# Patient Record
Sex: FEMALE | Race: WHITE | NOT HISPANIC OR LATINO | Employment: UNEMPLOYED | URBAN - METROPOLITAN AREA
[De-identification: names, ages, dates, MRNs, and addresses within clinical notes are randomized per-mention and may not be internally consistent; named-entity substitution may affect disease eponyms.]

---

## 2021-05-26 ENCOUNTER — RECORDS - HEALTHEAST (OUTPATIENT)
Dept: ADMINISTRATIVE | Facility: CLINIC | Age: 66
End: 2021-05-26

## 2022-01-01 ENCOUNTER — APPOINTMENT (OUTPATIENT)
Dept: CARDIOLOGY | Facility: CLINIC | Age: 67
DRG: 175 | End: 2022-01-01
Attending: STUDENT IN AN ORGANIZED HEALTH CARE EDUCATION/TRAINING PROGRAM
Payer: COMMERCIAL

## 2022-01-01 ENCOUNTER — APPOINTMENT (OUTPATIENT)
Dept: CT IMAGING | Facility: CLINIC | Age: 67
DRG: 175 | End: 2022-01-01
Attending: EMERGENCY MEDICINE
Payer: COMMERCIAL

## 2022-01-01 ENCOUNTER — PATIENT OUTREACH (OUTPATIENT)
Dept: CARE COORDINATION | Facility: CLINIC | Age: 67
End: 2022-01-01

## 2022-01-01 ENCOUNTER — APPOINTMENT (OUTPATIENT)
Dept: ULTRASOUND IMAGING | Facility: CLINIC | Age: 67
DRG: 175 | End: 2022-01-01
Attending: EMERGENCY MEDICINE
Payer: COMMERCIAL

## 2022-01-01 ENCOUNTER — HOSPITAL ENCOUNTER (INPATIENT)
Facility: CLINIC | Age: 67
LOS: 3 days | Discharge: HOME | DRG: 175 | End: 2022-12-27
Attending: EMERGENCY MEDICINE | Admitting: STUDENT IN AN ORGANIZED HEALTH CARE EDUCATION/TRAINING PROGRAM
Payer: COMMERCIAL

## 2022-01-01 VITALS
OXYGEN SATURATION: 92 % | DIASTOLIC BLOOD PRESSURE: 69 MMHG | TEMPERATURE: 97.6 F | HEART RATE: 82 BPM | HEIGHT: 65 IN | RESPIRATION RATE: 16 BRPM | WEIGHT: 139.11 LBS | BODY MASS INDEX: 23.18 KG/M2 | SYSTOLIC BLOOD PRESSURE: 100 MMHG

## 2022-01-01 DIAGNOSIS — M79.89 LEFT LEG SWELLING: ICD-10-CM

## 2022-01-01 DIAGNOSIS — J18.9 PNEUMONIA OF LEFT LOWER LOBE DUE TO INFECTIOUS ORGANISM: ICD-10-CM

## 2022-01-01 DIAGNOSIS — R06.02 SOB (SHORTNESS OF BREATH): ICD-10-CM

## 2022-01-01 DIAGNOSIS — Z00.00 PREVENTATIVE HEALTH CARE: Primary | ICD-10-CM

## 2022-01-01 DIAGNOSIS — C34.92 STAGE IV ADENOCARCINOMA OF LUNG, LEFT (H): ICD-10-CM

## 2022-01-01 DIAGNOSIS — I26.94 MULTIPLE SUBSEGMENTAL PULMONARY EMBOLI WITHOUT ACUTE COR PULMONALE (H): ICD-10-CM

## 2022-01-01 LAB
ALBUMIN SERPL-MCNC: 2.5 G/DL (ref 3.5–5)
ALP SERPL-CCNC: 60 U/L (ref 45–120)
ALT SERPL W P-5'-P-CCNC: 65 U/L (ref 0–45)
ANION GAP SERPL CALCULATED.3IONS-SCNC: 11 MMOL/L (ref 5–18)
ANION GAP SERPL CALCULATED.3IONS-SCNC: 8 MMOL/L (ref 5–18)
APTT PPP: 26 SECONDS (ref 22–38)
AST SERPL W P-5'-P-CCNC: 65 U/L (ref 0–40)
BASOPHILS # BLD AUTO: 0 10E3/UL (ref 0–0.2)
BASOPHILS NFR BLD AUTO: 0 %
BILIRUB SERPL-MCNC: <0.1 MG/DL (ref 0–1)
BNP SERPL-MCNC: 38 PG/ML (ref 0–112)
BUN SERPL-MCNC: 17 MG/DL (ref 8–22)
BUN SERPL-MCNC: 21 MG/DL (ref 8–22)
CALCIUM SERPL-MCNC: 8.5 MG/DL (ref 8.5–10.5)
CALCIUM SERPL-MCNC: 8.8 MG/DL (ref 8.5–10.5)
CHLORIDE BLD-SCNC: 105 MMOL/L (ref 98–107)
CHLORIDE BLD-SCNC: 110 MMOL/L (ref 98–107)
CO2 SERPL-SCNC: 21 MMOL/L (ref 22–31)
CO2 SERPL-SCNC: 22 MMOL/L (ref 22–31)
CREAT SERPL-MCNC: 0.82 MG/DL (ref 0.6–1.1)
CREAT SERPL-MCNC: 0.84 MG/DL (ref 0.6–1.1)
CREAT SERPL-MCNC: 0.85 MG/DL (ref 0.6–1.1)
CREAT SERPL-MCNC: 0.94 MG/DL (ref 0.6–1.1)
EOSINOPHIL # BLD AUTO: 0.1 10E3/UL (ref 0–0.7)
EOSINOPHIL NFR BLD AUTO: 1 %
ERYTHROCYTE [DISTWIDTH] IN BLOOD BY AUTOMATED COUNT: 16.2 % (ref 10–15)
ERYTHROCYTE [DISTWIDTH] IN BLOOD BY AUTOMATED COUNT: 16.3 % (ref 10–15)
FLUAV RNA SPEC QL NAA+PROBE: NEGATIVE
FLUBV RNA RESP QL NAA+PROBE: NEGATIVE
GFR SERPL CREATININE-BSD FRML MDRD: 66 ML/MIN/1.73M2
GFR SERPL CREATININE-BSD FRML MDRD: 75 ML/MIN/1.73M2
GFR SERPL CREATININE-BSD FRML MDRD: 76 ML/MIN/1.73M2
GFR SERPL CREATININE-BSD FRML MDRD: 78 ML/MIN/1.73M2
GLUCOSE BLD-MCNC: 111 MG/DL (ref 70–125)
GLUCOSE BLD-MCNC: 139 MG/DL (ref 70–125)
HCT VFR BLD AUTO: 28.4 % (ref 35–47)
HCT VFR BLD AUTO: 30.1 % (ref 35–47)
HGB BLD-MCNC: 10 G/DL (ref 11.7–15.7)
HGB BLD-MCNC: 9.3 G/DL (ref 11.7–15.7)
HOLD SPECIMEN: NORMAL
HOLD SPECIMEN: NORMAL
IMM GRANULOCYTES # BLD: 0.1 10E3/UL
IMM GRANULOCYTES NFR BLD: 2 %
INR PPP: 1.03 (ref 0.85–1.15)
LACTATE SERPL-SCNC: 0.7 MMOL/L (ref 0.7–2)
LVEF ECHO: NORMAL
LYMPHOCYTES # BLD AUTO: 0.9 10E3/UL (ref 0.8–5.3)
LYMPHOCYTES NFR BLD AUTO: 17 %
MCH RBC QN AUTO: 30.9 PG (ref 26.5–33)
MCH RBC QN AUTO: 31.5 PG (ref 26.5–33)
MCHC RBC AUTO-ENTMCNC: 32.7 G/DL (ref 31.5–36.5)
MCHC RBC AUTO-ENTMCNC: 33.2 G/DL (ref 31.5–36.5)
MCV RBC AUTO: 94 FL (ref 78–100)
MCV RBC AUTO: 95 FL (ref 78–100)
MONOCYTES # BLD AUTO: 0.5 10E3/UL (ref 0–1.3)
MONOCYTES NFR BLD AUTO: 9 %
MRSA DNA SPEC QL NAA+PROBE: NEGATIVE
NEUTROPHILS # BLD AUTO: 3.8 10E3/UL (ref 1.6–8.3)
NEUTROPHILS NFR BLD AUTO: 71 %
NRBC # BLD AUTO: 0 10E3/UL
NRBC BLD AUTO-RTO: 0 /100
PLATELET # BLD AUTO: 151 10E3/UL (ref 150–450)
PLATELET # BLD AUTO: 160 10E3/UL (ref 150–450)
POTASSIUM BLD-SCNC: 3.6 MMOL/L (ref 3.5–5)
POTASSIUM BLD-SCNC: 3.8 MMOL/L (ref 3.5–5)
PROCALCITONIN SERPL-MCNC: 0.18 NG/ML (ref 0–0.49)
PROT SERPL-MCNC: 5.6 G/DL (ref 6–8)
RBC # BLD AUTO: 3.01 10E6/UL (ref 3.8–5.2)
RBC # BLD AUTO: 3.17 10E6/UL (ref 3.8–5.2)
RSV RNA SPEC NAA+PROBE: NEGATIVE
SA TARGET DNA: NEGATIVE
SARS-COV-2 RNA RESP QL NAA+PROBE: NEGATIVE
SODIUM SERPL-SCNC: 137 MMOL/L (ref 136–145)
SODIUM SERPL-SCNC: 140 MMOL/L (ref 136–145)
TROPONIN I SERPL-MCNC: <0.01 NG/ML (ref 0–0.29)
UFH PPP CHRO-ACNC: 0.39 IU/ML
UFH PPP CHRO-ACNC: 0.43 IU/ML
UFH PPP CHRO-ACNC: 0.63 IU/ML
VANCOMYCIN SERPL-MCNC: 9.4 MG/L
WBC # BLD AUTO: 5.3 10E3/UL (ref 4–11)
WBC # BLD AUTO: 7.6 10E3/UL (ref 4–11)

## 2022-01-01 PROCEDURE — 99232 SBSQ HOSP IP/OBS MODERATE 35: CPT | Performed by: HOSPITALIST

## 2022-01-01 PROCEDURE — 87641 MR-STAPH DNA AMP PROBE: CPT | Performed by: STUDENT IN AN ORGANIZED HEALTH CARE EDUCATION/TRAINING PROGRAM

## 2022-01-01 PROCEDURE — 85027 COMPLETE CBC AUTOMATED: CPT | Performed by: EMERGENCY MEDICINE

## 2022-01-01 PROCEDURE — 85520 HEPARIN ASSAY: CPT | Performed by: HOSPITALIST

## 2022-01-01 PROCEDURE — 80048 BASIC METABOLIC PNL TOTAL CA: CPT | Performed by: EMERGENCY MEDICINE

## 2022-01-01 PROCEDURE — 84132 ASSAY OF SERUM POTASSIUM: CPT | Performed by: STUDENT IN AN ORGANIZED HEALTH CARE EDUCATION/TRAINING PROGRAM

## 2022-01-01 PROCEDURE — 250N000011 HC RX IP 250 OP 636: Performed by: STUDENT IN AN ORGANIZED HEALTH CARE EDUCATION/TRAINING PROGRAM

## 2022-01-01 PROCEDURE — 83605 ASSAY OF LACTIC ACID: CPT | Performed by: STUDENT IN AN ORGANIZED HEALTH CARE EDUCATION/TRAINING PROGRAM

## 2022-01-01 PROCEDURE — 99285 EMERGENCY DEPT VISIT HI MDM: CPT | Mod: CS,25

## 2022-01-01 PROCEDURE — 250N000013 HC RX MED GY IP 250 OP 250 PS 637: Performed by: HOSPITALIST

## 2022-01-01 PROCEDURE — 85730 THROMBOPLASTIN TIME PARTIAL: CPT | Performed by: EMERGENCY MEDICINE

## 2022-01-01 PROCEDURE — 99222 1ST HOSP IP/OBS MODERATE 55: CPT | Performed by: STUDENT IN AN ORGANIZED HEALTH CARE EDUCATION/TRAINING PROGRAM

## 2022-01-01 PROCEDURE — 85520 HEPARIN ASSAY: CPT | Performed by: STUDENT IN AN ORGANIZED HEALTH CARE EDUCATION/TRAINING PROGRAM

## 2022-01-01 PROCEDURE — 82565 ASSAY OF CREATININE: CPT | Performed by: STUDENT IN AN ORGANIZED HEALTH CARE EDUCATION/TRAINING PROGRAM

## 2022-01-01 PROCEDURE — 250N000011 HC RX IP 250 OP 636: Performed by: EMERGENCY MEDICINE

## 2022-01-01 PROCEDURE — 93306 TTE W/DOPPLER COMPLETE: CPT

## 2022-01-01 PROCEDURE — 120N000004 HC R&B MS OVERFLOW

## 2022-01-01 PROCEDURE — 71275 CT ANGIOGRAPHY CHEST: CPT

## 2022-01-01 PROCEDURE — 80202 ASSAY OF VANCOMYCIN: CPT | Performed by: HOSPITALIST

## 2022-01-01 PROCEDURE — 93306 TTE W/DOPPLER COMPLETE: CPT | Mod: 26 | Performed by: INTERNAL MEDICINE

## 2022-01-01 PROCEDURE — 258N000003 HC RX IP 258 OP 636: Performed by: STUDENT IN AN ORGANIZED HEALTH CARE EDUCATION/TRAINING PROGRAM

## 2022-01-01 PROCEDURE — 250N000013 HC RX MED GY IP 250 OP 250 PS 637: Performed by: STUDENT IN AN ORGANIZED HEALTH CARE EDUCATION/TRAINING PROGRAM

## 2022-01-01 PROCEDURE — 99233 SBSQ HOSP IP/OBS HIGH 50: CPT | Performed by: HOSPITALIST

## 2022-01-01 PROCEDURE — 93971 EXTREMITY STUDY: CPT | Mod: LT

## 2022-01-01 PROCEDURE — 87637 SARSCOV2&INF A&B&RSV AMP PRB: CPT | Performed by: EMERGENCY MEDICINE

## 2022-01-01 PROCEDURE — 85004 AUTOMATED DIFF WBC COUNT: CPT | Performed by: STUDENT IN AN ORGANIZED HEALTH CARE EDUCATION/TRAINING PROGRAM

## 2022-01-01 PROCEDURE — 80053 COMPREHEN METABOLIC PANEL: CPT | Performed by: STUDENT IN AN ORGANIZED HEALTH CARE EDUCATION/TRAINING PROGRAM

## 2022-01-01 PROCEDURE — 210N000002 HC R&B HEART CARE

## 2022-01-01 PROCEDURE — 93005 ELECTROCARDIOGRAM TRACING: CPT | Performed by: EMERGENCY MEDICINE

## 2022-01-01 PROCEDURE — 36415 COLL VENOUS BLD VENIPUNCTURE: CPT | Performed by: EMERGENCY MEDICINE

## 2022-01-01 PROCEDURE — 83880 ASSAY OF NATRIURETIC PEPTIDE: CPT | Performed by: EMERGENCY MEDICINE

## 2022-01-01 PROCEDURE — 250N000013 HC RX MED GY IP 250 OP 250 PS 637: Performed by: EMERGENCY MEDICINE

## 2022-01-01 PROCEDURE — 84484 ASSAY OF TROPONIN QUANT: CPT | Performed by: EMERGENCY MEDICINE

## 2022-01-01 PROCEDURE — 85610 PROTHROMBIN TIME: CPT | Performed by: EMERGENCY MEDICINE

## 2022-01-01 PROCEDURE — 84145 PROCALCITONIN (PCT): CPT | Performed by: STUDENT IN AN ORGANIZED HEALTH CARE EDUCATION/TRAINING PROGRAM

## 2022-01-01 PROCEDURE — 99239 HOSP IP/OBS DSCHRG MGMT >30: CPT | Performed by: HOSPITALIST

## 2022-01-01 PROCEDURE — C9803 HOPD COVID-19 SPEC COLLECT: HCPCS

## 2022-01-01 RX ORDER — LIDOCAINE 40 MG/G
CREAM TOPICAL
Status: DISCONTINUED | OUTPATIENT
Start: 2022-01-01 | End: 2022-01-01 | Stop reason: HOSPADM

## 2022-01-01 RX ORDER — ASPIRIN 81 MG/1
1 TABLET, CHEWABLE ORAL DAILY
Status: ON HOLD | COMMUNITY
Start: 2022-01-01 | End: 2022-01-01

## 2022-01-01 RX ORDER — NALOXONE HYDROCHLORIDE 0.4 MG/ML
0.2 INJECTION, SOLUTION INTRAMUSCULAR; INTRAVENOUS; SUBCUTANEOUS
Status: DISCONTINUED | OUTPATIENT
Start: 2022-01-01 | End: 2022-01-01 | Stop reason: HOSPADM

## 2022-01-01 RX ORDER — OXYCODONE AND ACETAMINOPHEN 5; 325 MG/1; MG/1
2 TABLET ORAL ONCE
Status: COMPLETED | OUTPATIENT
Start: 2022-01-01 | End: 2022-01-01

## 2022-01-01 RX ORDER — PROCHLORPERAZINE MALEATE 10 MG
10 TABLET ORAL EVERY 8 HOURS PRN
COMMUNITY
Start: 2022-01-01

## 2022-01-01 RX ORDER — OXYCODONE HYDROCHLORIDE 5 MG/1
5-10 TABLET ORAL EVERY 4 HOURS PRN
Status: DISCONTINUED | OUTPATIENT
Start: 2022-01-01 | End: 2022-01-01

## 2022-01-01 RX ORDER — QUETIAPINE FUMARATE 25 MG/1
50 TABLET, FILM COATED ORAL AT BEDTIME
Status: DISCONTINUED | OUTPATIENT
Start: 2022-01-01 | End: 2022-01-01 | Stop reason: HOSPADM

## 2022-01-01 RX ORDER — DEXAMETHASONE 4 MG/1
8 TABLET ORAL SEE ADMIN INSTRUCTIONS
COMMUNITY
Start: 2022-01-01

## 2022-01-01 RX ORDER — LIDOCAINE 4 G/G
1 PATCH TOPICAL
Status: DISCONTINUED | OUTPATIENT
Start: 2022-01-01 | End: 2022-01-01 | Stop reason: HOSPADM

## 2022-01-01 RX ORDER — LEVOFLOXACIN 750 MG/1
750 TABLET, FILM COATED ORAL DAILY
Status: DISCONTINUED | OUTPATIENT
Start: 2022-01-01 | End: 2022-01-01 | Stop reason: HOSPADM

## 2022-01-01 RX ORDER — POLYETHYLENE GLYCOL 3350 17 G/17G
17 POWDER, FOR SOLUTION ORAL DAILY
Status: DISCONTINUED | OUTPATIENT
Start: 2022-01-01 | End: 2022-01-01 | Stop reason: HOSPADM

## 2022-01-01 RX ORDER — LORAZEPAM 1 MG/1
1-2 TABLET ORAL EVERY 4 HOURS PRN
Status: DISCONTINUED | OUTPATIENT
Start: 2022-01-01 | End: 2022-01-01 | Stop reason: HOSPADM

## 2022-01-01 RX ORDER — ASPIRIN 81 MG/1
81 TABLET, CHEWABLE ORAL DAILY
Start: 2022-01-01

## 2022-01-01 RX ORDER — FOLIC ACID 1 MG/1
1000 TABLET ORAL DAILY
Status: DISCONTINUED | OUTPATIENT
Start: 2022-01-01 | End: 2022-01-01 | Stop reason: HOSPADM

## 2022-01-01 RX ORDER — OXYCODONE HYDROCHLORIDE 5 MG/1
5-10 TABLET ORAL EVERY 4 HOURS PRN
COMMUNITY
Start: 2022-01-01

## 2022-01-01 RX ORDER — MULTIPLE VITAMINS W/ MINERALS TAB 9MG-400MCG
2 TAB ORAL DAILY
Status: ON HOLD | COMMUNITY
End: 2022-01-01

## 2022-01-01 RX ORDER — MORPHINE SULFATE 15 MG/1
15 TABLET, FILM COATED, EXTENDED RELEASE ORAL EVERY 8 HOURS
Status: DISCONTINUED | OUTPATIENT
Start: 2022-01-01 | End: 2022-01-01 | Stop reason: HOSPADM

## 2022-01-01 RX ORDER — ONDANSETRON 8 MG/1
8 TABLET, FILM COATED ORAL EVERY 8 HOURS PRN
COMMUNITY
Start: 2022-01-01

## 2022-01-01 RX ORDER — NALOXONE HYDROCHLORIDE 0.4 MG/ML
0.4 INJECTION, SOLUTION INTRAMUSCULAR; INTRAVENOUS; SUBCUTANEOUS
Status: DISCONTINUED | OUTPATIENT
Start: 2022-01-01 | End: 2022-01-01 | Stop reason: HOSPADM

## 2022-01-01 RX ORDER — POLYETHYLENE GLYCOL 3350 17 G/17G
17 POWDER, FOR SOLUTION ORAL DAILY
COMMUNITY
Start: 2022-01-01

## 2022-01-01 RX ORDER — GABAPENTIN 300 MG/1
300 CAPSULE ORAL ONCE
Status: COMPLETED | OUTPATIENT
Start: 2022-01-01 | End: 2022-01-01

## 2022-01-01 RX ORDER — PIPERACILLIN SODIUM, TAZOBACTAM SODIUM 3; .375 G/15ML; G/15ML
3.38 INJECTION, POWDER, LYOPHILIZED, FOR SOLUTION INTRAVENOUS ONCE
Status: COMPLETED | OUTPATIENT
Start: 2022-01-01 | End: 2022-01-01

## 2022-01-01 RX ORDER — SODIUM CHLORIDE, SODIUM LACTATE, POTASSIUM CHLORIDE, CALCIUM CHLORIDE 600; 310; 30; 20 MG/100ML; MG/100ML; MG/100ML; MG/100ML
INJECTION, SOLUTION INTRAVENOUS ONCE
Status: COMPLETED | OUTPATIENT
Start: 2022-01-01 | End: 2022-01-01

## 2022-01-01 RX ORDER — GABAPENTIN 300 MG/1
300 CAPSULE ORAL 3 TIMES DAILY
Status: DISCONTINUED | OUTPATIENT
Start: 2022-01-01 | End: 2022-01-01 | Stop reason: HOSPADM

## 2022-01-01 RX ORDER — MORPHINE SULFATE 15 MG/1
15 TABLET, FILM COATED, EXTENDED RELEASE ORAL EVERY 8 HOURS
COMMUNITY
Start: 2022-01-01

## 2022-01-01 RX ORDER — PIPERACILLIN SODIUM, TAZOBACTAM SODIUM 3; .375 G/15ML; G/15ML
3.38 INJECTION, POWDER, LYOPHILIZED, FOR SOLUTION INTRAVENOUS EVERY 8 HOURS
Status: DISCONTINUED | OUTPATIENT
Start: 2022-01-01 | End: 2022-01-01

## 2022-01-01 RX ORDER — LORAZEPAM 0.5 MG/1
1-2 TABLET ORAL EVERY 4 HOURS PRN
COMMUNITY
Start: 2022-01-01

## 2022-01-01 RX ORDER — SENNOSIDES 8.6 MG
1 TABLET ORAL 2 TIMES DAILY
Status: DISCONTINUED | OUTPATIENT
Start: 2022-01-01 | End: 2022-01-01 | Stop reason: HOSPADM

## 2022-01-01 RX ORDER — IBUPROFEN 200 MG
200-400 TABLET ORAL EVERY 4 HOURS PRN
Status: ON HOLD | COMMUNITY
End: 2022-01-01

## 2022-01-01 RX ORDER — SENNOSIDES A AND B 8.6 MG/1
17.2 TABLET, FILM COATED ORAL 2 TIMES DAILY
COMMUNITY
Start: 2022-05-24

## 2022-01-01 RX ORDER — GABAPENTIN 300 MG/1
300 CAPSULE ORAL 3 TIMES DAILY
COMMUNITY
Start: 2022-05-09 | End: 2023-12-07

## 2022-01-01 RX ORDER — TIOTROPIUM BROMIDE INHALATION SPRAY 3.12 UG/1
2 SPRAY, METERED RESPIRATORY (INHALATION) DAILY
COMMUNITY
Start: 2022-01-01

## 2022-01-01 RX ORDER — HEPARIN SODIUM,PORCINE 10 UNIT/ML
3 VIAL (ML) INTRAVENOUS
Status: DISCONTINUED | OUTPATIENT
Start: 2022-01-01 | End: 2022-01-01 | Stop reason: HOSPADM

## 2022-01-01 RX ORDER — HEPARIN SODIUM 10000 [USP'U]/100ML
0-5000 INJECTION, SOLUTION INTRAVENOUS CONTINUOUS
Status: DISCONTINUED | OUTPATIENT
Start: 2022-01-01 | End: 2022-01-01

## 2022-01-01 RX ORDER — NICOTINE 21 MG/24HR
1 PATCH, TRANSDERMAL 24 HOURS TRANSDERMAL EVERY 24 HOURS
COMMUNITY
Start: 2022-05-18 | End: 2022-01-01

## 2022-01-01 RX ORDER — LEVOFLOXACIN 500 MG/1
500 TABLET, FILM COATED ORAL DAILY
Qty: 3 TABLET | Refills: 0 | Status: SHIPPED | OUTPATIENT
Start: 2022-01-01 | End: 2022-01-01

## 2022-01-01 RX ORDER — ONDANSETRON 4 MG/1
8 TABLET, FILM COATED ORAL EVERY 8 HOURS PRN
Status: DISCONTINUED | OUTPATIENT
Start: 2022-01-01 | End: 2022-01-01 | Stop reason: HOSPADM

## 2022-01-01 RX ORDER — QUETIAPINE FUMARATE 50 MG/1
1-2 TABLET, FILM COATED ORAL AT BEDTIME
COMMUNITY
Start: 2022-01-01

## 2022-01-01 RX ORDER — OXYCODONE AND ACETAMINOPHEN 5; 325 MG/1; MG/1
1-2 TABLET ORAL EVERY 4 HOURS PRN
COMMUNITY
Start: 2022-01-01

## 2022-01-01 RX ORDER — FOLIC ACID 1 MG/1
1000 TABLET ORAL DAILY
Status: ON HOLD | COMMUNITY
Start: 2022-01-01 | End: 2022-01-01

## 2022-01-01 RX ORDER — PROCHLORPERAZINE MALEATE 10 MG
10 TABLET ORAL EVERY 8 HOURS PRN
Status: DISCONTINUED | OUTPATIENT
Start: 2022-01-01 | End: 2022-01-01 | Stop reason: HOSPADM

## 2022-01-01 RX ORDER — IOPAMIDOL 755 MG/ML
55 INJECTION, SOLUTION INTRAVASCULAR ONCE
Status: COMPLETED | OUTPATIENT
Start: 2022-01-01 | End: 2022-01-01

## 2022-01-01 RX ORDER — FOLIC ACID 1 MG/1
1000 TABLET ORAL DAILY
Start: 2022-01-01

## 2022-01-01 RX ORDER — MULTIPLE VITAMINS W/ MINERALS TAB 9MG-400MCG
2 TAB ORAL DAILY
Start: 2022-01-01

## 2022-01-01 RX ORDER — OXYCODONE AND ACETAMINOPHEN 5; 325 MG/1; MG/1
1-2 TABLET ORAL EVERY 4 HOURS PRN
Status: DISCONTINUED | OUTPATIENT
Start: 2022-01-01 | End: 2022-01-01 | Stop reason: HOSPADM

## 2022-01-01 RX ORDER — LIDOCAINE 50 MG/G
OINTMENT TOPICAL
COMMUNITY
Start: 2022-01-01

## 2022-01-01 RX ADMIN — HEPARIN SODIUM 1100 UNITS/HR: 10000 INJECTION, SOLUTION INTRAVENOUS at 12:03

## 2022-01-01 RX ADMIN — Medication 1500 MG: at 22:58

## 2022-01-01 RX ADMIN — APIXABAN 10 MG: 5 TABLET, FILM COATED ORAL at 21:44

## 2022-01-01 RX ADMIN — LORAZEPAM 1 MG: 1 TABLET ORAL at 12:04

## 2022-01-01 RX ADMIN — PIPERACILLIN AND TAZOBACTAM 3.38 G: 3; .375 INJECTION, POWDER, FOR SOLUTION INTRAVENOUS at 17:19

## 2022-01-01 RX ADMIN — GABAPENTIN 300 MG: 300 CAPSULE ORAL at 14:18

## 2022-01-01 RX ADMIN — QUETIAPINE FUMARATE 50 MG: 25 TABLET ORAL at 21:45

## 2022-01-01 RX ADMIN — FOLIC ACID 1000 MCG: 1 TABLET ORAL at 10:06

## 2022-01-01 RX ADMIN — OXYCODONE HYDROCHLORIDE AND ACETAMINOPHEN 1 TABLET: 5; 325 TABLET ORAL at 21:19

## 2022-01-01 RX ADMIN — MORPHINE SULFATE 15 MG: 15 TABLET, EXTENDED RELEASE ORAL at 21:20

## 2022-01-01 RX ADMIN — GABAPENTIN 300 MG: 300 CAPSULE ORAL at 08:03

## 2022-01-01 RX ADMIN — POLYETHYLENE GLYCOL 3350 17 G: 17 POWDER, FOR SOLUTION ORAL at 10:08

## 2022-01-01 RX ADMIN — UMECLIDINIUM 1 PUFF: 62.5 AEROSOL, POWDER ORAL at 09:01

## 2022-01-01 RX ADMIN — MORPHINE SULFATE 15 MG: 15 TABLET, EXTENDED RELEASE ORAL at 05:30

## 2022-01-01 RX ADMIN — OXYCODONE HYDROCHLORIDE AND ACETAMINOPHEN 2 TABLET: 5; 325 TABLET ORAL at 09:00

## 2022-01-01 RX ADMIN — PIPERACILLIN AND TAZOBACTAM 3.38 G: 3; .375 INJECTION, POWDER, FOR SOLUTION INTRAVENOUS at 01:48

## 2022-01-01 RX ADMIN — APIXABAN 10 MG: 5 TABLET, FILM COATED ORAL at 10:06

## 2022-01-01 RX ADMIN — LIDOCAINE 1 PATCH: 246 PATCH TOPICAL at 13:39

## 2022-01-01 RX ADMIN — OXYCODONE HYDROCHLORIDE AND ACETAMINOPHEN 2 TABLET: 5; 325 TABLET ORAL at 03:10

## 2022-01-01 RX ADMIN — GABAPENTIN 300 MG: 300 CAPSULE ORAL at 17:44

## 2022-01-01 RX ADMIN — MORPHINE SULFATE 15 MG: 15 TABLET, EXTENDED RELEASE ORAL at 13:40

## 2022-01-01 RX ADMIN — LEVOFLOXACIN 750 MG: 750 TABLET, FILM COATED ORAL at 10:07

## 2022-01-01 RX ADMIN — SENNOSIDES 1 TABLET: 8.6 TABLET, FILM COATED ORAL at 08:03

## 2022-01-01 RX ADMIN — GABAPENTIN 300 MG: 300 CAPSULE ORAL at 13:40

## 2022-01-01 RX ADMIN — HEPARIN SODIUM 1100 UNITS/HR: 10000 INJECTION, SOLUTION INTRAVENOUS at 13:27

## 2022-01-01 RX ADMIN — APIXABAN 10 MG: 5 TABLET, FILM COATED ORAL at 13:39

## 2022-01-01 RX ADMIN — POLYETHYLENE GLYCOL 3350 17 G: 17 POWDER, FOR SOLUTION ORAL at 08:04

## 2022-01-01 RX ADMIN — VANCOMYCIN HYDROCHLORIDE 750 MG: 5 INJECTION, POWDER, LYOPHILIZED, FOR SOLUTION INTRAVENOUS at 09:52

## 2022-01-01 RX ADMIN — MORPHINE SULFATE 15 MG: 15 TABLET, EXTENDED RELEASE ORAL at 21:45

## 2022-01-01 RX ADMIN — GABAPENTIN 300 MG: 300 CAPSULE ORAL at 09:01

## 2022-01-01 RX ADMIN — MORPHINE SULFATE 15 MG: 15 TABLET, EXTENDED RELEASE ORAL at 14:19

## 2022-01-01 RX ADMIN — SENNOSIDES 1 TABLET: 8.6 TABLET, FILM COATED ORAL at 21:18

## 2022-01-01 RX ADMIN — IOPAMIDOL 55 ML: 755 INJECTION, SOLUTION INTRAVENOUS at 18:55

## 2022-01-01 RX ADMIN — PIPERACILLIN AND TAZOBACTAM 3.38 G: 3; .375 INJECTION, POWDER, FOR SOLUTION INTRAVENOUS at 09:01

## 2022-01-01 RX ADMIN — SENNOSIDES 1 TABLET: 8.6 TABLET, FILM COATED ORAL at 21:45

## 2022-01-01 RX ADMIN — GABAPENTIN 300 MG: 300 CAPSULE ORAL at 10:08

## 2022-01-01 RX ADMIN — PIPERACILLIN AND TAZOBACTAM 3.38 G: 3; .375 INJECTION, POWDER, FOR SOLUTION INTRAVENOUS at 19:54

## 2022-01-01 RX ADMIN — SENNOSIDES 1 TABLET: 8.6 TABLET, FILM COATED ORAL at 09:00

## 2022-01-01 RX ADMIN — OXYCODONE HYDROCHLORIDE AND ACETAMINOPHEN 2 TABLET: 5; 325 TABLET ORAL at 08:40

## 2022-01-01 RX ADMIN — MORPHINE SULFATE 15 MG: 15 TABLET, EXTENDED RELEASE ORAL at 13:30

## 2022-01-01 RX ADMIN — MORPHINE SULFATE 15 MG: 15 TABLET, EXTENDED RELEASE ORAL at 05:48

## 2022-01-01 RX ADMIN — SENNOSIDES 1 TABLET: 8.6 TABLET, FILM COATED ORAL at 10:07

## 2022-01-01 RX ADMIN — LIDOCAINE 1 PATCH: 246 PATCH TOPICAL at 13:47

## 2022-01-01 RX ADMIN — UMECLIDINIUM 1 PUFF: 62.5 AEROSOL, POWDER ORAL at 10:09

## 2022-01-01 RX ADMIN — FOLIC ACID 1000 MCG: 1 TABLET ORAL at 08:04

## 2022-01-01 RX ADMIN — GABAPENTIN 300 MG: 300 CAPSULE ORAL at 21:44

## 2022-01-01 RX ADMIN — UMECLIDINIUM 1 PUFF: 62.5 AEROSOL, POWDER ORAL at 09:53

## 2022-01-01 RX ADMIN — LEVOFLOXACIN 750 MG: 750 TABLET, FILM COATED ORAL at 18:01

## 2022-01-01 RX ADMIN — GABAPENTIN 300 MG: 300 CAPSULE ORAL at 21:19

## 2022-01-01 RX ADMIN — PIPERACILLIN AND TAZOBACTAM 3.38 G: 3; .375 INJECTION, POWDER, FOR SOLUTION INTRAVENOUS at 02:54

## 2022-01-01 RX ADMIN — GABAPENTIN 300 MG: 300 CAPSULE ORAL at 13:25

## 2022-01-01 RX ADMIN — HEPARIN SODIUM 1100 UNITS/HR: 10000 INJECTION, SOLUTION INTRAVENOUS at 20:40

## 2022-01-01 RX ADMIN — OXYCODONE HYDROCHLORIDE AND ACETAMINOPHEN 2 TABLET: 5; 325 TABLET ORAL at 17:44

## 2022-01-01 RX ADMIN — PIPERACILLIN AND TAZOBACTAM 3.38 G: 3; .375 INJECTION, POWDER, FOR SOLUTION INTRAVENOUS at 09:52

## 2022-01-01 RX ADMIN — LIDOCAINE 1 PATCH: 246 PATCH TOPICAL at 14:19

## 2022-01-01 RX ADMIN — QUETIAPINE FUMARATE 50 MG: 25 TABLET ORAL at 23:45

## 2022-01-01 RX ADMIN — SODIUM CHLORIDE, POTASSIUM CHLORIDE, SODIUM LACTATE AND CALCIUM CHLORIDE: 600; 310; 30; 20 INJECTION, SOLUTION INTRAVENOUS at 22:57

## 2022-01-01 RX ADMIN — MORPHINE SULFATE 15 MG: 15 TABLET, EXTENDED RELEASE ORAL at 22:44

## 2022-01-01 RX ADMIN — FOLIC ACID 1000 MCG: 1 TABLET ORAL at 09:01

## 2022-01-01 RX ADMIN — QUETIAPINE FUMARATE 50 MG: 25 TABLET ORAL at 21:18

## 2022-01-01 RX ADMIN — MORPHINE SULFATE 15 MG: 15 TABLET, EXTENDED RELEASE ORAL at 06:10

## 2022-01-01 ASSESSMENT — ACTIVITIES OF DAILY LIVING (ADL)
ADLS_ACUITY_SCORE: 30
ADLS_ACUITY_SCORE: 34
ADLS_ACUITY_SCORE: 32
ADLS_ACUITY_SCORE: 33
ADLS_ACUITY_SCORE: 30
ADLS_ACUITY_SCORE: 30
ADLS_ACUITY_SCORE: 34
ADLS_ACUITY_SCORE: 30
ADLS_ACUITY_SCORE: 30
ADLS_ACUITY_SCORE: 34
TOILETING_ISSUES: NO
ADLS_ACUITY_SCORE: 30
ADLS_ACUITY_SCORE: 30
ADLS_ACUITY_SCORE: 34
ADLS_ACUITY_SCORE: 34
ADLS_ACUITY_SCORE: 32
ADLS_ACUITY_SCORE: 33
ADLS_ACUITY_SCORE: 30
ADLS_ACUITY_SCORE: 34
ADLS_ACUITY_SCORE: 30
ADLS_ACUITY_SCORE: 30
ADLS_ACUITY_SCORE: 34
ADLS_ACUITY_SCORE: 30
ADLS_ACUITY_SCORE: 34
ADLS_ACUITY_SCORE: 30
ADLS_ACUITY_SCORE: 30
ADLS_ACUITY_SCORE: 34
ADLS_ACUITY_SCORE: 33
DEPENDENT_IADLS:: CLEANING;LAUNDRY;SHOPPING;TRANSPORTATION
ADLS_ACUITY_SCORE: 37
ADLS_ACUITY_SCORE: 33
ADLS_ACUITY_SCORE: 34
ADLS_ACUITY_SCORE: 30
ADLS_ACUITY_SCORE: 30
ADLS_ACUITY_SCORE: 34
ADLS_ACUITY_SCORE: 30

## 2022-12-24 PROBLEM — I26.94 MULTIPLE SUBSEGMENTAL PULMONARY EMBOLI WITHOUT ACUTE COR PULMONALE (H): Status: ACTIVE | Noted: 2022-01-01

## 2022-12-24 PROBLEM — R06.02 SOB (SHORTNESS OF BREATH): Status: ACTIVE | Noted: 2022-01-01

## 2022-12-24 PROBLEM — M79.89 LEFT LEG SWELLING: Status: ACTIVE | Noted: 2022-01-01

## 2022-12-24 PROBLEM — C79.51 SECONDARY MALIGNANT NEOPLASM OF BONE (H): Status: ACTIVE | Noted: 2022-04-13

## 2022-12-24 PROBLEM — C79.31 SECONDARY MALIGNANT NEOPLASM OF BRAIN (H): Status: ACTIVE | Noted: 2022-04-13

## 2022-12-24 PROBLEM — C34.32 MALIGNANT NEOPLASM OF LOWER LOBE OF LEFT LUNG (H): Status: ACTIVE | Noted: 2022-04-13

## 2022-12-24 PROBLEM — J43.9 PULMONARY EMPHYSEMA (H): Status: ACTIVE | Noted: 2022-03-15

## 2022-12-24 NOTE — ED PROVIDER NOTES
EMERGENCY DEPARTMENT ENCOUNTER      NAME: Lindsay Diop  AGE: 67 year old female  YOB: 1955  MRN: [id number]  EVALUATION DATE & TIME: 12/24/2022  4:53 PM    PCP: No primary care provider on file.    ED PROVIDER: Brielle Melton MD    Chief Complaint   Patient presents with     Leg Swelling         FINAL IMPRESSION:  1. Multiple subsegmental pulmonary emboli without acute cor pulmonale (H)    2. SOB (shortness of breath)    3. Left leg swelling    4. Pneumonia of left lower lobe due to infectious organism          ED COURSE & MEDICAL DECISION MAKING:    Pertinent Labs & Imaging studies reviewed. 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  Approximately 1 week of slowly progressive dyspnea.  Patient had imaging 2 days ago without signs of significant worsening pulmonary metastasis, pulmonary edema, pericardial effusion.  There was a new left pleural effusion, this was not quantified on the CT read.  She has some chronic anemia that was down slightly 9.9 from 11.8.  With her left lower extremity swelling concern for DVT/concurrent PE given dyspnea.  Clinically no signs of erythema or warmth to suggest a cellulitis.  Less likely Ramos's cyst as the swelling does go to the groin.    Patient placed on monitor, IV established and blood obtained.  Twelve-lead EKG was obtained showing sinus rhythm.  No ischemic changes.  Patient given her home gabapentin and Percocet which she is due for.  Rapid COVID/influenza/RSV swab negative.  CBC, BMP, BNP, troponin, coags notable for baseline anemia with hemoglobin 10.0.  Duplex ultrasound left lower extremity unremarkable, no DVT.  CT PE study shows evidence of bilateral pulmonary embolism and new left lower lobe infiltrate.  As this is the location of her mass will treat as  a postobstructive pneumonia with Zosyn.  Started on heparin, and will be admitted to hospital for further management.       ED Course as of 12/24/22 1933   Sat Dec 24, 2022   1721 I met with the patient to gather history and perform an initial exam. PPE: gloves, procedure mask   1726 SpO2: 90 %   1813 Hemoglobin(!): 10.0  baseline   1906 CT reviewed by myself with evidence of pulmonary embolism   1915 Spoke w body rad - + PRIMO, lingula, RLL.  LLL infiltrate.    1916 I was informed by Radiology that CT shows PE   1917 I updated patient on CT results   1931 I discussed patient with Dr. Betancourt, Hospitalist       Medical Decision Making    History:    Supplemental history from: Family Member/Significant Other    External Record(s) reviewed: Outpatient Record: [facility] visits    Work Up:    Chart documentation includes differential considered and any EKGs or imaging independently interpreted by provider.    In additional to work up documented, I considered the following work up: See chart documentation, if applicable.    External consultation:    Discussion of management with another provider: See chart documentation, if applicable    Complicating factors:    Care impacted by chronic illness: Cancer/Chemotherapy and Other: Hypercholesterolemia    Care affected by social determinants of health: N/A    Disposition considerations: Admit.        At the conclusion of the encounter I discussed the results of all of the tests and the disposition. The questions were answered. The patient or family acknowledged understanding and was agreeable with the care plan.    CONSULTS:  Hospitalist    CRITICAL CARE:  Critical Care  Performed by: Brielle Melton MD  Authorized by: Brielle Melton MD     Total critical care time: 42 minutes  Criticalcare time was exclusive of separately billable procedures and treating other patients.  Critical care was necessary to treat or prevent imminent or life-threatening deterioration of the  following conditions: Cardiopulmonary decompensation, death, disability  Critical care was time spent personally by me on the following activities: development of treatment plan with patient or surrogate, discussions with consultants, examination of patient, evaluation of patient's response totreatment, obtaining history from patient or surrogate, ordering and performing treatments and interventions, ordering and review of laboratory studies, ordering and review of radiographic studies and re-evaluation ofpatient's condition, this excludes any separately billable procedures.      MEDICATIONS GIVEN IN THE EMERGENCY:  Medications   heparin ANTICOAGULANT Loading dose for HIGH INTENSITY TREATMENT * Give BEFORE starting heparin infusion (has no administration in time range)   heparin 25,000 units in 0.45% NaCl 250 mL ANTICOAGULANT infusion (has no administration in time range)   piperacillin-tazobactam (ZOSYN) 3.375 g vial to attach to  mL bag (has no administration in time range)   oxyCODONE-acetaminophen (PERCOCET) 5-325 MG per tablet 2 tablet (2 tablets Oral Given 12/24/22 1744)   gabapentin (NEURONTIN) capsule 300 mg (300 mg Oral Given 12/24/22 1744)   iopamidol (ISOVUE-370) solution 55 mL (55 mLs Intravenous Given 12/24/22 1855)       NEW PRESCRIPTIONS STARTED AT TODAY'S ER VISIT  New Prescriptions    No medications on file          =================================================================    HPI    Patient information was obtained from: Patient, Daughter    Use of Intrepreter: N/A      Lindsaytimothy Diop is a 67 year old female with pertinent medical history of hypercholesterolemia, pulmonary emphysema, malignant neoplasm of lower lobe of left lung, brain metastases, bone metastases, liver metastases who presents to the ED by walk in for evaluation of leg swelling.     Chart review:  12/22/2022: Patient's most recent Oncology visit: Patient went in for port labs. WBC 5.7, Hgb 9/9, Pl 141, ANC 3.8. CT and  MRI completed. Patient reported difficulty lifting her head up straight, pain to neck and low back. Patient's MS contin prescription increased, percocet prescribed PRN.      12/22/2022 CT Chest abd pelvis:  IMPRESSION:  1. Mass in the left lower lobe is very difficult to discretely measure due to adjacent fluid and atelectasis. It is grossly unchanged. An adjacent nodule also in the left lower lobe is increased in size.  2. New left pleural effusion.  3. Increase in size of the AP window lymph node in the mediastinum.    12/22/2022 MRI cervical spine:   IMPRESSION:  1. Nonspecific soft tissue edema and enhancement throughout the posterior paraspinal musculature of the cervical and upper thoracic region. Some additional edema and enhancement extending to involve the interspinous spaces centered at C5-6. No discrete soft tissue mass to suggest a neoplastic process. No loculated fluid collection or bony edema to support the diagnosis of spinal infection. Muscular strain/injury in the setting of trauma could have this appearance. In the absence of a traumatic history nonspecific inflammatory or autoimmune myositis can also be considered. Clinical correlation is needed. Consider repeat MRI if the patient's symptoms are progressive.  2. Multilevel cervical spondylosis.  3. At C5-6, mild to moderate spinal canal stenosis with moderate to severe right and severe left neural foraminal stenosis. Correlate with C6 radicular symptoms.  4. At C4-5, severe left neural foraminal stenosis. Correlate with left C5 radicular symptoms.  5. At C6-7, moderate spinal canal stenosis with mild to moderate right and moderate left neural foraminal stenosis.  6. Nodular enhancing lesion within the inferior right cerebellum consistent with known brain metastasis and similar to findings on 12/1/2022 brain MRI.      Per patient,  Patient reports left lower extremity swelling that began this morning. Patient noticed her foot was swollen when she  tried on some slippers only to find she was unable to put the left one on. Patient reports the swelling goes up to her thigh as well. Patient denies any pain with her leg swelling. Patient reports her chronic shortness of breath is worse than normal, and has been worsening for the past 1 week. Patient denies history of heart failure, clotting, or bleeding disorders. She denies any other concerns at this time.    Per daughter,  Patient recently had an MRI and CT at [facility]. Patient has abdominal swelling due to steroids. Patient had been undergoing chemotherapy, but recently ended chemo treatment. A lesion on patient's lower spine was identified in September and has grown since then. Patient has undergone 5 rounds of radiation to her back for this, with her most recent radiation treatment on 12/20 (4 days ago).       REVIEW OF SYSTEMS  Constitutional:  Denies fever, chills, weight loss or weakness   Respiratory: No wheeze or cough. Positive for chronic shortness of breath, worse over past 1 week  Cardiovascular:  No CP, palpitations  GI:  Denies abdominal pain, nausea, vomiting, diarrhea. Positive for abdominal distension  : Denies dysuria, denies hematuria  Musculoskeletal:  Denies any new muscle/joint pain, or loss of function. Positive for left lower extremity swelling.  Skin:  Denies rash, pallor  Neurologic:  Denies headache, focal weakness or sensory changes  All other systems negative unless noted in HPI.      PAST MEDICAL HISTORY:  Past Medical History:   Diagnosis Date     Bone metastasis (H)      Brain metastasis (H)      COPD (chronic obstructive pulmonary disease) (H)      HLD (hyperlipidemia)      Hypertension      Liver metastases (H)      Lung cancer (H)        PAST SURGICAL HISTORY:  History reviewed. No pertinent surgical history.    CURRENT MEDICATIONS:    Prior to Admission Medications   Prescriptions Last Dose Informant Patient Reported? Taking?   LORazepam (ATIVAN) 0.5 MG tablet   Yes No   Sig:  Take 1-2 mg by mouth every 4 hours as needed   QUEtiapine (SEROQUEL) 50 MG tablet   Yes No   Sig: Take 1-2 tablets by mouth At Bedtime   SPIRIVA RESPIMAT 2.5 MCG/ACT inhaler   Yes No   Sig: Inhale 2 puffs into the lungs daily   aspirin (ASA) 81 MG chewable tablet   Yes Yes   Sig: Take 1 tablet by mouth daily   dexamethasone (DECADRON) 4 MG tablet   Yes Yes   Sig: Take 8 mg by mouth See Admin Instructions Take 2 Tablets (8 mg) by mouth two times a day with meals. for 3 days the day before, the day of, and the day after treatment   folic acid (FOLVITE) 1 MG tablet   Yes No   Sig: Take 1,000 mcg by mouth daily   gabapentin (NEURONTIN) 300 MG capsule   Yes Yes   Sig: Take 300 mg by mouth 3 times daily   ibuprofen (ADVIL/MOTRIN) 200 MG tablet   Yes No   Sig: Take 200-400 mg by mouth every 4 hours as needed   lidocaine (XYLOCAINE) 5 % external ointment   Yes Yes   Sig: 3 times daily   morphine (MS CONTIN) 15 MG CR tablet   Yes Yes   Sig: Take 15 mg by mouth every 8 hours   multivitamin w/minerals (MULTIVITAMINS W/MINERALS) tablet   Yes No   Sig: Take 2 tablets by mouth daily   nicotine (NICODERM CQ) 21 MG/24HR 24 hr patch   Yes No   Sig: Apply 1 patch topically every 24 hours   oxyCODONE (ROXICODONE) 5 MG tablet   Yes No   Sig: Take 5-10 mg by mouth every 4 hours as needed   polyethylene glycol (MIRALAX) 17 GM/Dose powder   Yes Yes   Sig: Take 17 g by mouth daily   prochlorperazine (COMPAZINE) 10 MG tablet   Yes No   Sig: Take 10 mg by mouth every 8 hours as needed   senna (SENOKOT) 8.6 MG tablet   Yes Yes   Sig: Take 17.2 mg by mouth 2 times daily Can increase up to 4 tabs twice daily if ongoing constipation.      Facility-Administered Medications: None       ALLERGIES:  No Known Allergies    FAMILY HISTORY:  History reviewed. No pertinent family history.    SOCIAL HISTORY:        VITALS:  Patient Vitals for the past 24 hrs:   BP Temp Temp src Pulse Resp SpO2 Weight   12/24/22 1845 124/67 -- -- 81 -- -- --   12/24/22  1830 122/65 -- -- 70 -- -- --   12/24/22 1715 124/64 97.6  F (36.4  C) Temporal 80 18 95 % --   12/24/22 1631 130/62 97.8  F (36.6  C) Oral 85 18 90 % 62.1 kg (137 lb)       PHYSICAL EXAM    General Appearance: Chronically ill-appearing adult female, petite, no acute distress  Head:  Normocephalic  Eyes:   conjunctiva/corneas clear  ENT:  membranes are moist without pallor  Neck:  Supple, pain with range of motion to the neck, chronic  Chest:  No tenderness or deformity, no crepitus  Cardio:  Regular rate and rhythm, no murmur/gallop/rub  Pulm:  No respiratory distress, clear to auscultation bilaterally  Back:  no CVA tenderness, normal ROM  Abdomen:  Soft, non-tender, slightly distended but with no fluid wave, no rebound or guarding.  Extremities: 2-3+ edema to the left lower extremity to the groin.  No associated erythema, warmth.  No appreciable swelling to the right lower extremity.  Good distal pulses.  No pain with range of motion.  Compartments are soft.  Skin:  Skin warm, dry, no rashes  Neuro:  Alert and oriented ×3, moving all extremities, no gross sensory defects     RADIOLOGY/LABS:  Reviewed all pertinent imaging. Please see official radiology report. All pertinent labs reviewed and interpreted.    Results for orders placed or performed during the hospital encounter of 12/24/22   US Lower Extremity Venous Duplex Left    Impression    IMPRESSION:  1.  No deep venous thrombosis in the left lower extremity.   CT Chest Pulmonary Embolism w Contrast    Impression    IMPRESSION:  1.  Pulmonary emboli to the pulmonary arteries to the left upper lobe and lingula as well as to the right lower lobe. No evidence of right heart strain.  2.  Left lower lobe infiltrate and left pleural effusion. Infiltrate appears to be increased when compared with the previous exam.  3.  Increased size of the left lower lobe mass.  4.  Bilateral pulmonary nodules.  5.  Moderate emphysema.  6.  Increased mediastinal  lymphadenopathy.    7.  These findings were discussed with Dr. Basilio at the time of this dictation.   CBC (+ platelets, no diff)   Result Value Ref Range    WBC Count 7.6 4.0 - 11.0 10e3/uL    RBC Count 3.17 (L) 3.80 - 5.20 10e6/uL    Hemoglobin 10.0 (L) 11.7 - 15.7 g/dL    Hematocrit 30.1 (L) 35.0 - 47.0 %    MCV 95 78 - 100 fL    MCH 31.5 26.5 - 33.0 pg    MCHC 33.2 31.5 - 36.5 g/dL    RDW 16.3 (H) 10.0 - 15.0 %    Platelet Count 160 150 - 450 10e3/uL   Basic metabolic panel   Result Value Ref Range    Sodium 137 136 - 145 mmol/L    Potassium 3.8 3.5 - 5.0 mmol/L    Chloride 105 98 - 107 mmol/L    Carbon Dioxide (CO2) 21 (L) 22 - 31 mmol/L    Anion Gap 11 5 - 18 mmol/L    Urea Nitrogen 21 8 - 22 mg/dL    Creatinine 0.82 0.60 - 1.10 mg/dL    Calcium 8.8 8.5 - 10.5 mg/dL    Glucose 139 (H) 70 - 125 mg/dL    GFR Estimate 78 >60 mL/min/1.73m2   Result Value Ref Range    INR 1.03 0.85 - 1.15   Result Value Ref Range    aPTT 26 22 - 38 Seconds   B-Type Natriuretic Peptide ([facility] Only)   Result Value Ref Range    BNP 38 0 - 112 pg/mL   Troponin I (now)   Result Value Ref Range    Troponin I <0.01 0.00 - 0.29 ng/mL   Symptomatic Influenza A/B & SARS-CoV2 (COVID-19) Virus PCR Multiplex Nose    Specimen: Nose; Swab   Result Value Ref Range    Influenza A PCR Negative Negative    Influenza B PCR Negative Negative    RSV PCR Negative Negative    SARS CoV2 PCR Negative Negative       EKG:  Performed at: 6:32 PM    Impression: Sinus rhythm. Left axis deviation. Abnormal ECG    Rate: 70  Rhythm: Sinus  Axis: -37  OH Interval: 152  QRS Interval: 86  QTc Interval: 440  ST Changes: None  Comparison: No previous ECGs available    I have independently reviewed and interpreted the EKG(s) documented above.      The creation of this record is based on the scribe s observations of the work being performed by Brielle Melton MD and the provider s statements to them. It was created on her behalf by Soha Acevedo, a trained medical  kathie. This document has been checked and approved by the attending provider.    Brielle Melton MD  Emergency Medicine  [facility] EMERGENCY ROOM  [address] [facility] [id number] [phone]  Dept: [phone]       Brielle Melton MD  12/24/22 1934

## 2022-12-24 NOTE — ED TRIAGE NOTES
"Pt presents to the ED with c/o left leg swelling. Pt endorses hx of lesion on spine. Pt talked to oncologist and was told to come into the ED. Pt reports swelling from toes to upper left leg. Denies \"calf pain\" at this time. No hx of DVT. Not on blood thinners. Pt reports hx of chronic pain that she takes many pain meds for.      Triage Assessment     Row Name 12/24/22 8032       Triage Assessment (Adult)    Airway WDL WDL       Respiratory WDL    Respiratory WDL WDL       Skin Circulation/Temperature WDL    Skin Circulation/Temperature WDL WDL       Cardiac WDL    Cardiac WDL WDL       Peripheral/Neurovascular WDL    Peripheral Neurovascular WDL WDL       Cognitive/Neuro/Behavioral WDL    Cognitive/Neuro/Behavioral WDL WDL              "

## 2022-12-25 PROBLEM — C34.92: Status: ACTIVE | Noted: 2022-01-01

## 2022-12-25 PROBLEM — J18.9 PNEUMONIA OF LEFT LOWER LOBE DUE TO INFECTIOUS ORGANISM: Status: ACTIVE | Noted: 2022-01-01

## 2022-12-25 NOTE — H&P
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      #History of metastatic non-small lung adenocarcinoma.  #Pulmonary embolism  #Pneumonia  -Diagnosed with non-small left lung adenocarcinoma in 03/2022  -Multiple mets including liver, brain and bone.  -Underwent palliative radiation for bone pain.  -Currently following up with oncology as an outpatient and receiving chemotherapy via trial.  -Presented with few days of cough, left lower extremity swelling and mild shortness of breath.  - CTA chest showed multiple pulmonary emboli in the left upper lobe and right lower lobe.  Left lower lobe infiltrate with small left pleural effusion. Increased size of left lower lobe mass with bilateral pulmonary nodules.  Moderate emphysema.  -Left lower extremity ultrasound did not show any DVT.  -Started on heparin in the ED.  -Started on Zosyn in the emergency department.    Plan:  [] Continue heparin drip.  [] Transthoracic echocardiogram.  [] Continue Zosyn.  [] Start vancomycin.  [] Sputum culture.  [] Continue to monitor respiratory status.  [] Follow-up with blood cultures.  [] I explained to the patient the risk of her current medical condition and possible progression of her infection or complication by pulmonary embolism.  I tried to explain that in the case of cardiac arrest, no meaningful recovery after CPR is highly unlikely.  Nevertheless, the patient insisted on keeping her CODE STATUS full code for now.  I encouraged her to discuss her CODE STATUS and goals of care with her family and physician tomorrow.  [] Continue home pain medications including MS  constant 15 mg every 8 hours and oxycodone 5 to 10 mg every 4 hours as needed.  [] Continue bowel regimen.  [] Continue home Ativan as needed.  [] Continue home gabapentin 300 mg 3 times daily            DVTP: On heparin drip  Code Status: No Order  Disposition: Inpatient   Expected LOS: 2 days   Goals for the hospitalization:   Disposition Plan      Expected Discharge Date: 12/26/2022      Destination: home with family;home with help/services          The patient's care was discussed with the Patient.  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    She was in her usual state of health until 3 to 4 days ago when she started to experience intermittent productive cough.  On 12/23/2022 she noticed significant swelling of left lower extremity without any pain.  She also reported mild shortness of breath on exertion.  She denies any chest pain, heart capitation, syncope.  She denies any hemoptysis.  She denies any fever or chills.  She denies any nausea or vomiting.    Vitals on presentation: Blood pressure around 130/60, heart rate around 80, SPO2 around 92% on room air.  Labs significant for hemoglobin around 10, platelet around 160.  CTA chest showed multiple pulmonary emboli in the left upper lobe and right lower lobe.  Left lower lobe infiltrate with small left pleural effusion. Increased size of left lower lobe mass with bilateral pulmonary nodules.  Moderate emphysema.         Past Medical History     Past Medical History:  No date: Bone metastasis (H)  No date: Brain metastasis (H)  No date: COPD (chronic obstructive pulmonary disease) (H)  No date: HLD (hyperlipidemia)  No date:  Hypertension  No date: Liver metastases (H)  No date: Lung cancer (H)     Surgical History   History reviewed. No pertinent surgical history.  Family History    Reviewed, and History reviewed. No pertinent family history.     Social History          Allergies   No Known Allergies  Prior to Admission Medications      Prior to Admission Medications   Prescriptions Last Dose Informant Patient Reported? Taking?   LORazepam (ATIVAN) 0.5 MG tablet More than a month  Yes Yes   Sig: Take 1-2 mg by mouth every 4 hours as needed   QUEtiapine (SEROQUEL) 50 MG tablet 12/23/2022  Yes Yes   Sig: Take 1-2 tablets by mouth At Bedtime   SPIRIVA RESPIMAT 2.5 MCG/ACT inhaler 12/24/2022  Yes Yes   Sig: Inhale 2 puffs into the lungs daily   aspirin (ASA) 81 MG chewable tablet 12/24/2022  Yes Yes   Sig: Take 1 tablet by mouth daily   dexamethasone (DECADRON) 4 MG tablet 12/23/2022  Yes Yes   Sig: Take 8 mg by mouth See Admin Instructions Take 2 Tablets (8 mg) by mouth two times a day with meals. for 3 days the day before, the day of, and the day after treatment   folic acid (FOLVITE) 1 MG tablet 12/24/2022  Yes Yes   Sig: Take 1,000 mcg by mouth daily   gabapentin (NEURONTIN) 300 MG capsule 12/24/2022  Yes Yes   Sig: Take 300 mg by mouth 3 times daily   ibuprofen (ADVIL/MOTRIN) 200 MG tablet Past Week  Yes Yes   Sig: Take 200-400 mg by mouth every 4 hours as needed   lidocaine (XYLOCAINE) 5 % external ointment 12/24/2022  Yes Yes   Sig: 3 times daily   morphine (MS CONTIN) 15 MG CR tablet 12/24/2022 at am  Yes Yes   Sig: Take 15 mg by mouth every 8 hours   multivitamin w/minerals (THERA-VIT-M) tablet 12/24/2022  Yes Yes   Sig: Take 2 tablets by mouth daily   ondansetron (ZOFRAN) 8 MG tablet 12/23/2022  Yes Yes   Sig: Take 8 mg by mouth every 8 hours as needed   oxyCODONE (ROXICODONE) 5 MG tablet 12/24/2022  Yes Yes   Sig: Take 5-10 mg by mouth every 4 hours as needed   oxyCODONE-acetaminophen (PERCOCET) 5-325 MG tablet   Yes Yes   Sig:  Take 1-2 tablets by mouth every 4 hours as needed   polyethylene glycol (MIRALAX) 17 GM/Dose powder 12/24/2022  Yes Yes   Sig: Take 17 g by mouth daily   prochlorperazine (COMPAZINE) 10 MG tablet Past Week  Yes Yes   Sig: Take 10 mg by mouth every 8 hours as needed   senna (SENOKOT) 8.6 MG tablet 12/24/2022  Yes Yes   Sig: Take 17.2 mg by mouth 2 times daily Can increase up to 4 tabs twice daily if ongoing constipation.      Facility-Administered Medications: None      Review of Systems     A 12 point comprehensive review of systems was negative except as noted above in HPI.    PHYSICAL EXAMINATION       Vitals      Temp:  [97.6  F (36.4  C)-97.8  F (36.6  C)] 97.6  F (36.4  C)  Pulse:  [70-85] 72  Resp:  [18-26] 26  BP: ()/(51-67) 96/55  SpO2:  [90 %-96 %] 96 %    Examination     Physical Exam  Constitutional:       General: She is not in acute distress.  Cardiovascular:      Rate and Rhythm: Normal rate.   Pulmonary:      Effort: No respiratory distress.      Comments: Decreased breath sounds on the left lower lung.  Mild left basilar crackles.  Abdominal:      General: Abdomen is flat. There is no distension.      Palpations: Abdomen is soft.      Tenderness: There is no abdominal tenderness.   Musculoskeletal:         General: Swelling present.      Left lower leg: Edema present.      Comments: Mild edema of left lower extremity below knee level and compression to right lower extremity.   Skin:     General: Skin is warm and dry.   Neurological:      General: No focal deficit present.      Mental Status: She is alert.   Psychiatric:         Mood and Affect: Mood normal.         Behavior: Behavior normal.         Pertinent Lab     Most Recent 3 CBC's:Recent Labs   Lab Test 12/24/22  1755   WBC 7.6   HGB 10.0*   MCV 95        Most Recent 3 BMP's:Recent Labs   Lab Test 12/24/22  1755      POTASSIUM 3.8   CHLORIDE 105   CO2 21*   BUN 21   CR 0.82   ANIONGAP 11   CARMEN 8.8   *     Most Recent 2  LFT's:No lab results found.  Most Recent 3 INR's:Recent Labs   Lab Test 12/24/22  1755   INR 1.03         Pertinent Radiology     Radiology Results:   Recent Results (from the past 24 hour(s))   US Lower Extremity Venous Duplex Left    Narrative    EXAM: US LOWER EXTREMITY VENOUS DUPLEX LEFT  LOCATION: [facility]  DATE/TIME: 12/24/2022 6:30 PM    INDICATION: Left leg swelling. History of lung cancer with metastases.  COMPARISON: None.  TECHNIQUE: Venous Duplex ultrasound of the left lower extremity with and without compression, augmentation and duplex. Color flow and spectral Doppler with waveform analysis performed.    FINDINGS: Exam includes the common femoral, femoral, popliteal, and contralateral common femoral veins as well as segmentally visualized deep calf veins and greater saphenous vein.     LEFT: No deep vein thrombosis. No superficial thrombophlebitis.      Impression    IMPRESSION:  1.  No deep venous thrombosis in the left lower extremity.   CT Chest Pulmonary Embolism w Contrast    Narrative    EXAM: CT CHEST PULMONARY EMBOLISM W CONTRAST  LOCATION: [facility]  DATE/TIME: 12/24/2022 7:02 PM    INDICATION: sob lung ca eval PE  COMPARISON: 09/20/2022 TECHNIQUE: CT chest pulmonary angiogram during arterial phase injection of IV contrast. Multiplanar reformats and MIP reconstructions were performed. Dose reduction techniques were used.   CONTRAST: 55ml Isovue 370    FINDINGS:  ANGIOGRAM CHEST: Pulmonary emboli identified in the pulmonary arteries to the left upper lobe and lingula. Pulmonary embolism is also identified in the pulmonary artery to the right lower lobe.  Thoracic aorta is negative for dissection. No CT evidence of right heart strain.    LUNGS AND PLEURA: Moderate emphysematous changes of the lungs.  2. Related nodular densities are identified in the right upper lobe. These may be related to apical scarring. However, other etiologies cannot  be excluded. This includes a 6 nodular density on image #44, series 7 and 7 mm nodular density on image #41.   Additionally, in the inferior aspect of the right upper lobe, an 8 mm nodular density is identified, best identified on image #126. No infiltrate is identified in the left lower lobe. Small left pleural effusion. Diffuse groundglass opacities are   identified throughout the remainder of the left lower lobe. In the anterior aspect of the left lower lobe superiorly, a 6 mm pulmonary nodules identified best identified on image #136. No pneumothorax. A left lower lobe mass is identified laterally. This   has increased in size. On the current exam this measures approximately 2.6 x 2.6 cm as measured on image #186. This previously measured 1.6 x 1.5 cm.     MEDIASTINUM/AXILLAE: Right-sided Port-A-Cath. AP window lymphadenopathy has increased. No axillary lymphadenopathy. The heart is borderline in size. No pericardial effusion. Patulous distal esophagus.    CORONARY ARTERY CALCIFICATION: Mild.    UPPER ABDOMEN: No acute findings.    MUSCULOSKELETAL: Normal.      Impression    IMPRESSION:  1.  Pulmonary emboli to the pulmonary arteries to the left upper lobe and lingula as well as to the right lower lobe. No evidence of right heart strain.  2.  Left lower lobe infiltrate and left pleural effusion. Infiltrate appears to be increased when compared with the previous exam.  3.  Increased size of the left lower lobe mass.  4.  Bilateral pulmonary nodules.  5.  Moderate emphysema.  6.  Increased mediastinal lymphadenopathy.    7.  These findings were discussed with Dr. Basilio at the time of this dictation.     EKG Results: personally reviewed.     Advance Care Planning        DRE GUERRERO MD  [facility]   Phone: #[phone]

## 2022-12-25 NOTE — CONSULTS
Care Management Initial Consult    General Information  Assessment completed with: Patient,    Type of CM/SW Visit: Initial Assessment    Primary Care Provider verified and updated as needed: Yes   Readmission within the last 30 days: no previous admission in last 30 days      Reason for Consult: discharge planning  Advance Care Planning: Advance Care Planning Reviewed: other (see comments) (Given [employer] information)     General Information Comments: Lives alone    Communication Assessment  Patient's communication style: spoken language (English or Bilingual)    Hearing Difficulty or Deaf: no   Wear Glasses or Blind: yes    Cognitive  Cognitive/Neuro/Behavioral: WDL                      Living Environment:   People in home: alone     Current living Arrangements: apartment      Able to return to prior arrangements: yes  Living Arrangement Comments: Lives alone    Family/Social Support:  Care provided by: self, child(cristiano)  Provides care for: no one, unable/limited ability to care for self  Marital Status: Single  Children          Description of Support System: Supportive, Involved    Support Assessment: Adequate family and caregiver support, Lacks adequate physical care    Current Resources:   Patient receiving home care services: No     Community Resources: None  Equipment currently used at home: none  Supplies currently used at home: Wound Care Supplies    Employment/Financial:  Employment Status: retired        Financial Concerns: No concerns identified           Lifestyle & Psychosocial Needs:  Social Determinants of Health     Tobacco Use: Not on file   Alcohol Use: Not on file   Financial Resource Strain: Not on file   Food Insecurity: Not on file   Transportation Needs: Not on file   Physical Activity: Not on file   Stress: Not on file   Social Connections: Not on file   Intimate Partner Violence: Not on file   Depression: Not on file   Housing Stability: Not on file       Functional Status:  Prior  "to admission patient needed assistance:   Dependent ADLs:: Ambulation-no assistive device, Bathing  Dependent IADLs:: Cleaning, Laundry, Shopping, Transportation  Assesssment of Functional Status: Not at  functional baseline    Mental Health Status:          Chemical Dependency Status:              Values/Beliefs:  Spiritual, Cultural Beliefs, Gnosticism Practices, Values that affect care:                 Additional Information:   Patient presents to  ED with c/o left leg swelling. Patient endorses history of lesion on spine. Patient talked to oncologist and was told to come into the ED. Patient reports swelling from toes to upper left leg. Denies \"calf pain\" at this time .    Alert, oriented patient states she lives alone in an apartment. Recently stopped chemotherapy. Uses no assistive devices; no home care services; doesn t drive, daughter helps with transportation. Reports she sets up her medications, self-administers independently, and showers on her own though sometimes has trouble doing so. Discussed possible need for home care services to assist but patient states  I don t want any strangers in my house . Plans to return home with daughter Neida transporting patient on discharge. Informed patient CM will follow progression of care. Other needs pending clinical progress.      CHERELLE DELEON, JOSE D/CM        "

## 2022-12-25 NOTE — PLAN OF CARE
"Patient admitted to  at 2150. Heparin drip infusing in port. New IV access obtained for IV ABX and LR. Lactic acid sent.  Patient reports she usually goes to sleep much earlier and wakes up for the day between 0200 and 0400. Reports no bowel movement for \"a few days\" and would like something PRN in AM.     Lilia Parnell RN on 12/24/2022 at 11:03 PM      Problem: Gas Exchange Impaired  Goal: Optimal Gas Exchange  Outcome: Not Progressing   Goal Outcome Evaluation:                        "

## 2022-12-25 NOTE — PROVIDER NOTIFICATION
8:25 AM Text Paged Dr. Dumont: Pt requesting her PRN oxycodone be changed to Percocet, this is what she takes at home JAMIL BURGOS RN Callback #[phone]

## 2022-12-25 NOTE — PLAN OF CARE
Pt complains of severe to moderate pain with movement during shift, controlled with PRN medications. Per pt her pain feels similar to her chronic cancer pain. Lidocaine patch placed on back/neck. Heparin gtt within goal x2, recheck in the AM per protocol. Pt voiding adequate amount of urine. Decreased appetite but tolerating PO intake. Intermittent use of 1L NC oxygen to maintain O2 >90%. Able to make needs known.

## 2022-12-25 NOTE — PHARMACY-VANCOMYCIN DOSING SERVICE
"Pharmacy Vancomycin Initial Note  Date of Service 2022  Patient's  1955  67 year old, female    Indication: Community Acquired Pneumonia    Current estimated CrCl = Estimated Creatinine Clearance: 65.1 mL/min (based on SCr of 0.82 mg/dL).    Creatinine for last 3 days  2022:  5:55 PM Creatinine 0.82 mg/dL    Recent Vancomycin Level(s) for last 3 days  No results found for requested labs within last 72 hours.      Vancomycin IV Administrations (past 72 hours)      No vancomycin orders with administrations in past 72 hours.                Nephrotoxins and other renal medications (From now, onward)    Start     Dose/Rate Route Frequency Ordered Stop    22 0200  piperacillin-tazobactam (ZOSYN) 3.375 g vial to attach to  mL bag        Note to Pharmacy: For [facility], [facility] and [facility]: For Zosyn-naive patients, use the \"Zosyn initial dose + extended infusion\" order panel.    3.375 g  over 240 Minutes Intravenous EVERY 8 HOURS 22 2156            Contrast Orders - past 72 hours (72h ago, onward)    Start     Dose/Rate Route Frequency Stop    22 1900  iopamidol (ISOVUE-370) solution 55 mL         55 mL Intravenous ONCE 22 1855          [employer] Prediction of Planned Initial Vancomycin Regimen  Loading dose: 1500 mg at 22:30 2022.  Regimen: 750 mg IV every 12 hours.  Start time: 22:02 on 2022  Exposure target: AUC24 (range)400-600 mg/L.hr   AUC24,ss: 533 mg/L.hr  Probability of AUC24 > 400: 79 %  Ctrough,ss: 17.5 mg/L  Probability of Ctrough,ss > 20: 38 %  Probability of nephrotoxicity (Lodise BRY ): 13 %      Plan:  1. Start vancomycin  1500 mg IV once then 750mg q12h.   2. Vancomycin monitoring method: AUC  3. Vancomycin therapeutic monitoring goal: 400-600 mg*h/L  4. Pharmacy will check vancomycin levels as appropriate in 1-3 Days.    5. Serum creatinine levels will be ordered daily for the first week of therapy and at least twice weekly for subsequent weeks.  "     Michael Perez, [facility]

## 2022-12-25 NOTE — PROGRESS NOTES
Call to pt-advised of mom's call to ofc and gala RAY comments/recommendations noted below. Patient voices understanding/agrees with plan/no further questions. 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    Principal Problem:    Multiple subsegmental pulmonary emboli without acute cor pulmonale (H) (12/24/2022)  Active Problems:    LLL CAP (12/25/2022)    Stage IV NSC adenoca of lung, with BBL mets, Primary tumor LLL (H) (12/25/2022)    Pulmonary emphysema (H) (3/15/2022)    SOB (shortness of breath) (12/24/2022)    Left leg swelling (12/24/2022), with negative work-up    #History of metastatic non-small lung adenocarcinoma.  #Pulmonary embolism  #Pneumonia  -Diagnosed with non-small left lung adenocarcinoma in 03/2022  -Multiple mets including liver, brain and bone.  -Underwent palliative radiation for bone pain.  -Currently following up with oncology as an outpatient and receiving chemotherapy via trial.  -Presented with few days of cough, left lower extremity swelling and mild shortness of breath.  - CTA chest showed multiple pulmonary emboli in the left upper lobe and right lower lobe.  Left lower lobe infiltrate with small left pleural effusion. Increased size of left lower lobe mass with bilateral pulmonary nodules.  Moderate emphysema.  -Left lower extremity ultrasound did not show any DVT.  -Started on heparin in the ED.  -Started on Zosyn in the ER, vancomycin was added later    Plan:  Continue heparin drip.  Transthoracic echocardiogram, report does not show any abnormality  Continue vancomycin And Zosyn  Sputum culture pending  Continue to monitor respiratory status.  Continue home pain medications including MS constant 15 mg every 8 hours and oxycodone 5 to 10  mg every 4 hours as needed.  Lidoderm was added to apply to the lower cervical upper thoracic spine region.  Continue bowel regimen.  Continue home Ativan as needed.  Continue home gabapentin 300 mg 3 times daily    DVTP: On heparin drip  Code Status: Full  Disposition: Inpatient   Expected LOS: 2 days   Goals for the hospitalization: Anticoagulation and switch to oral anticoagulant, antibiotic treatment of pneumonia, metastatic bone disease pain control.       Diet: Combination Diet Regular Diet Adult    DVT Prophylaxis: IV heparin  Jewell Catheter: Not present  Central Lines: PRESENT     Cardiac Monitoring: ACTIVE order. Indication: Pulmonary embolism  Code Status: Full Code      Disposition Plan      Expected Discharge Date: 12/27/2022      Destination: home with family;home with help/services  Discharge Comments: Hep gtt, IV abx        The patient's care was discussed with the Bedside Nurse and Patient.    Chantelle Dumont MD  Hospitalist Service  [facility]  Securely message with the Vocera Web Console (learn more here)  Text page via [employer] Paging/Directory         Clinically Significant Risk Factors Present on Admission              # Hypoalbuminemia: Lowest albumin = 2.5 g/dL at 12/25/2022  5:53 AM, will monitor as appropriate              ____________________________________________________________________    Interval History   Patient complains of pain over the bony metastatic region such as sacral area and lower C-spine.  Denies SOB or chest pain.  Is on 1 L of nasal cannula oxygen.  Is afebrile.  Vital signs are mostly stable.  She was not found to be in any distress and seems to be comfortable.  I had a detailed discussion with her about her illnesses and future plans etc.  I answered all her questions to her satisfaction.  She understands that general prognosis of her metastatic cancer is grave and agrees with current treatments and likes to be full code.  We do not have any  positive culture yet.  Echocardiogram is normal.  She is a stable and improved.  We will continue with a current treatments.  Most likely patient can be discharged home over the next 24-48 hours    Data reviewed today: I reviewed all medications, new labs and imaging results over the last 24 hours. I personally reviewed no images or EKG's today.    Physical Exam   Vital Signs: Temp: 97.7  F (36.5  C) Temp src: Oral BP: 91/54 Pulse: 71   Resp: 18 SpO2: 92 % O2 Device: None (Room air) Oxygen Delivery: 1 LPM  Weight: 132 lbs 8 oz  HEENT is unremarkable.  Nasal cannula oxygen in place.  Patient has a On.  Neck is supple with some tenderness over the lower spinal region of neck.  Chest: Normal breathing effort with 1 L/min nasal cannula oxygen.  There is decreased breathing sounds on left lower lung field with fine crackles without wheezing or rhonchi.  Heart with regular rate and rhythm  Abdomen is benign without tenderness, mass or organomegaly.  Extremities: Positive for pitting edema of left leg.  No cyanosis or clubbing noted.  Neurological exam is generally nonfocal she is alert and oriented    Data   Recent Labs   Lab 12/25/22  0553 12/24/22  1755   WBC 5.3 7.6   HGB 9.3* 10.0*   MCV 94 95    160   INR  --  1.03    137   POTASSIUM 3.6 3.8   CHLORIDE 110* 105   CO2 22 21*   BUN 17 21   CR 0.84 0.82   ANIONGAP 8 11   CARMEN 8.5 8.8    139*   ALBUMIN 2.5*  --    PROTTOTAL 5.6*  --    BILITOTAL <0.1  --    ALKPHOS 60  --    ALT 65*  --    AST 65*  --      Recent Results (from the past 24 hour(s))   US Lower Extremity Venous Duplex Left    Narrative    EXAM: US LOWER EXTREMITY VENOUS DUPLEX LEFT  LOCATION: [facility]  DATE/TIME: 12/24/2022 6:30 PM    INDICATION: Left leg swelling. History of lung cancer with metastases.  COMPARISON: None.  TECHNIQUE: Venous Duplex ultrasound of the left lower extremity with and without compression, augmentation and duplex. Color flow and  spectral Doppler with waveform analysis performed.    FINDINGS: Exam includes the common femoral, femoral, popliteal, and contralateral common femoral veins as well as segmentally visualized deep calf veins and greater saphenous vein.     LEFT: No deep vein thrombosis. No superficial thrombophlebitis.      Impression    IMPRESSION:  1.  No deep venous thrombosis in the left lower extremity.   CT Chest Pulmonary Embolism w Contrast    Narrative    EXAM: CT CHEST PULMONARY EMBOLISM W CONTRAST  LOCATION: [facility]  DATE/TIME: 12/24/2022 7:02 PM    INDICATION: sob lung ca eval PE  COMPARISON: 09/20/2022 TECHNIQUE: CT chest pulmonary angiogram during arterial phase injection of IV contrast. Multiplanar reformats and MIP reconstructions were performed. Dose reduction techniques were used.   CONTRAST: 55ml Isovue 370    FINDINGS:  ANGIOGRAM CHEST: Pulmonary emboli identified in the pulmonary arteries to the left upper lobe and lingula. Pulmonary embolism is also identified in the pulmonary artery to the right lower lobe.  Thoracic aorta is negative for dissection. No CT evidence of right heart strain.    LUNGS AND PLEURA: Moderate emphysematous changes of the lungs.  2. Related nodular densities are identified in the right upper lobe. These may be related to apical scarring. However, other etiologies cannot be excluded. This includes a 6 nodular density on image #44, series 7 and 7 mm nodular density on image #41.   Additionally, in the inferior aspect of the right upper lobe, an 8 mm nodular density is identified, best identified on image #126. No infiltrate is identified in the left lower lobe. Small left pleural effusion. Diffuse groundglass opacities are   identified throughout the remainder of the left lower lobe. In the anterior aspect of the left lower lobe superiorly, a 6 mm pulmonary nodules identified best identified on image #136. No pneumothorax. A left lower lobe mass is identified  laterally. This   has increased in size. On the current exam this measures approximately 2.6 x 2.6 cm as measured on image #186. This previously measured 1.6 x 1.5 cm.     MEDIASTINUM/AXILLAE: Right-sided Port-A-Cath. AP window lymphadenopathy has increased. No axillary lymphadenopathy. The heart is borderline in size. No pericardial effusion. Patulous distal esophagus.    CORONARY ARTERY CALCIFICATION: Mild.    UPPER ABDOMEN: No acute findings.    MUSCULOSKELETAL: Normal.      Impression    IMPRESSION:  1.  Pulmonary emboli to the pulmonary arteries to the left upper lobe and lingula as well as to the right lower lobe. No evidence of right heart strain.  2.  Left lower lobe infiltrate and left pleural effusion. Infiltrate appears to be increased when compared with the previous exam.  3.  Increased size of the left lower lobe mass.  4.  Bilateral pulmonary nodules.  5.  Moderate emphysema.  6.  Increased mediastinal lymphadenopathy.    7.  These findings were discussed with Dr. Basilio at the time of this dictation.   Echocardiogram Complete   Result Value    LVEF  60-65%    Narrative    [id number]  [id number]  [id number]  [id number]^YOLANDA^[place] [address]     Name: JUAN RAMON CUNHA  MRN: [id number]  : 1955  Study Date: 2022 09:40 AM  Age: 67 yrs  Gender: Female  Patient Location: [facility]  Reason For Study: Pulmonary Embolism  Ordering Physician: DRE GUERRERO  Performed By: TEMO     BSA: 1.7 m2  Height: 65 in  Weight: 132 lb  HR: 64  BP: 138/64 mmHg  ______________________________________________________________________________  Procedure  Complete Portable Echo Adult. No hemodynamically significant valvular  abnormalities on 2D or color flow imaging. There is no comparison study  available.  ______________________________________________________________________________  Interpretation Summary     1.Left ventricular size, wall motion and function are normal. The  ejection  fraction is 60-65%.  2.Normal right ventricle size and systolic function. No obvious Jones  sign.  3.No hemodynamically significant valvular abnormalities on 2D or color flow  imaging.  4.IVC diameter <2.1 cm collapsing >50% with sniff suggests a normal RA  pressure of 3 mmHg.  There is no comparison study available.  ______________________________________________________________________________  I      WMSI = 1.00     % Normal = 100     X - Cannot   0 -                      (2) - Mildly 2 -          Segments  Size  Interpret    Hyperkinetic 1 - Normal  Hypokinetic  Hypokinetic  1-2     small                                                     7 -          3-5      moderate  3 - Akinetic 4 -          5 -         6 - Akinetic Dyskinetic   6-14    large               Dyskinetic   Aneurysmal  w/scar       w/scar       15-16   diffuse     Left Ventricle  Left ventricular size, wall motion and function are normal. The ejection  fraction is 60-65%. There is normal left ventricular wall thickness. Left  ventricular diastolic function is normal. No regional wall motion  abnormalities noted.     Right Ventricle  Normal right ventricle size and systolic function. No obvious Jones sign.  TAPSE is normal, which is consistent with normal right ventricular systolic  function.     Atria  Normal left atrial size. Right atrial size is normal. There is no color  Doppler evidence of an atrial shunt.     Mitral Valve  Mitral valve leaflets appear normal. There is no evidence of mitral stenosis  or clinically significant mitral regurgitation. There is no mitral  regurgitation noted. There is no mitral valve stenosis.     Tricuspid Valve  The tricuspid valve is not well visualized, but is grossly normal. Right  ventricle systolic pressure estimate normal. There is trace to mild tricuspid  regurgitation. There is no tricuspid stenosis.     Aortic Valve  Aortic valve leaflets appear normal. There is no evidence of aortic  stenosis  or clinically significant aortic regurgitation. The aortic valve is  trileaflet. No aortic regurgitation is present. No aortic stenosis is present.     Pulmonic Valve  The pulmonic valve is not well seen, but is grossly normal. This degree of  valvular regurgitation is within normal limits. There is no pulmonic valvular  stenosis.     Vessels  The aorta root is normal. Normal size ascending aorta. IVC diameter <2.1 cm  collapsing >50% with sniff suggests a normal RA pressure of 3 mmHg.     Pericardium  There is no pericardial effusion.     Rhythm  Sinus rhythm was noted.     ______________________________________________________________________________  MMode/2D Measurements & Calculations  IVSd: 0.88 cm  LVIDd: 4.2 cm  LVIDs: 2.2 cm  LVPWd: 1.2 cm  FS: 47.5 %     LV mass(C)d: 144.0 grams  LV mass(C)dI: 86.9 grams/m2  Ao root diam: 3.1 cm  LA dimension: 3.4 cm  asc Aorta Diam: 3.7 cm  LA/Ao: 1.1  LVOT diam: 2.0 cm  LVOT area: 3.2 cm2  LA Volume (BP): 55.3 ml  LA Volume Index (BP): 33.3 ml/m2     LA Volume Indexed (AL/bp): 36.3 ml/m2  RWT: 0.58     Doppler Measurements & Calculations  MV E max glenn: 75.7 cm/sec  MV A max glenn: 87.8 cm/sec  MV E/A: 0.86  MV max PG: 3.8 mmHg  MV mean P.4 mmHg  MV V2 VTI: 30.7 cm  MVA(VTI): 2.3 cm2  MV dec slope: 290.4 cm/sec2  MV dec time: 0.26 sec  Ao V2 max: 119.3 cm/sec  Ao max P.0 mmHg  Ao V2 mean: 83.7 cm/sec  Ao mean PG: 3.1 mmHg  Ao V2 VTI: 25.6 cm  FANNIE(I,D): 2.8 cm2  FANNIE(V,D): 3.1 cm2  LV V1 max P.5 mmHg  LV V1 max: 117.1 cm/sec  LV V1 VTI: 22.3 cm  SV(LVOT): 70.7 ml  SI(LVOT): 42.7 ml/m2  PA V2 max: 110.3 cm/sec  PA max P.9 mmHg  PA mean P.5 mmHg  PA V2 VTI: 24.4 cm  PA acc time: 0.09 sec  PI end-d glenn: 86.9 cm/sec  TR max glenn: 213.6 cm/sec  TR max P.2 mmHg     AV Glenn Ratio (DI): 0.98  FANNIE Index (cm2/m2): 1.7  E/E': 9.5  E/E' av.9  Lateral E/e': 9.4  Medial E/e': 10.4  Peak E' Glenn: 8.0 cm/sec      ______________________________________________________________________________  Report approved by: Glo Reyes 12/25/2022 02:06 PM           Medications     heparin 1,100 Units/hr (12/25/22 1327)     - MEDICATION INSTRUCTIONS -         folic acid  1,000 mcg Oral Daily     gabapentin  300 mg Oral TID     lidocaine  1 patch Transdermal Q24h    And     lidocaine   Transdermal Q8H ALANIS     morphine  15 mg Oral Q8H     piperacillin-tazobactam  3.375 g Intravenous Q8H     polyethylene glycol  17 g Oral Daily     QUEtiapine  50 mg Oral At Bedtime     sennosides  1 tablet Oral BID     sodium chloride (PF)  3 mL Intracatheter Q8H     umeclidinium  1 puff Inhalation Daily     vancomycin  750 mg Intravenous Q12H

## 2022-12-25 NOTE — PHARMACY-ADMISSION MEDICATION HISTORY
"Pharmacy Note - Admission Medication History    Pertinent Provider Information:     _Pain medications clarified with Pharmacy Dispense History, clinic note and the  Narc Report. Pt claims she takes both MS Contin and OxyContin, but I cannot find a prescription report about the OxyContin and I didn't included in her Med rec. She said,  \"I take > 100mg of  Oxycontin\"?????  ______________________________________________  Prior To Admission (PTA) med list completed and updated in EMR.       PTA Med List   Medication Sig Last Dose     aspirin (ASA) 81 MG chewable tablet Take 1 tablet by mouth daily 12/24/2022     dexamethasone (DECADRON) 4 MG tablet Take 8 mg by mouth See Admin Instructions Take 2 Tablets (8 mg) by mouth two times a day with meals. for 3 days the day before, the day of, and the day after treatment 12/23/2022     folic acid (FOLVITE) 1 MG tablet Take 1,000 mcg by mouth daily 12/24/2022     gabapentin (NEURONTIN) 300 MG capsule Take 300 mg by mouth 3 times daily 12/24/2022     ibuprofen (ADVIL/MOTRIN) 200 MG tablet Take 200-400 mg by mouth every 4 hours as needed Past Week     lidocaine (XYLOCAINE) 5 % external ointment 3 times daily 12/24/2022     LORazepam (ATIVAN) 0.5 MG tablet Take 1-2 mg by mouth every 4 hours as needed More than a month     morphine (MS CONTIN) 15 MG CR tablet Take 15 mg by mouth every 8 hours 12/24/2022 at am     multivitamin w/minerals (THERA-VIT-M) tablet Take 2 tablets by mouth daily 12/24/2022     ondansetron (ZOFRAN) 8 MG tablet Take 8 mg by mouth every 8 hours as needed 12/23/2022     oxyCODONE (ROXICODONE) 5 MG tablet Take 5-10 mg by mouth every 4 hours as needed 12/24/2022     oxyCODONE-acetaminophen (PERCOCET) 5-325 MG tablet Take 1-2 tablets by mouth every 4 hours as needed      polyethylene glycol (MIRALAX) 17 GM/Dose powder Take 17 g by mouth daily 12/24/2022     prochlorperazine (COMPAZINE) 10 MG tablet Take 10 mg by mouth every 8 hours as needed Past Week     " QUEtiapine (SEROQUEL) 50 MG tablet Take 1-2 tablets by mouth At Bedtime 12/23/2022     senna (SENOKOT) 8.6 MG tablet Take 17.2 mg by mouth 2 times daily Can increase up to 4 tabs twice daily if ongoing constipation. 12/24/2022     SPIRIVA RESPIMAT 2.5 MCG/ACT inhaler Inhale 2 puffs into the lungs daily 12/24/2022       Information source(s): Patient  Method of interview communication: in-person    Summary of Changes to PTA Med List  New:   Discontinued:   Changed:     Patient was asked about OTC/herbal products specifically.  PTA med list reflects this.    In the past week, patient estimated taking medication this percent of the time:  greater than 90%.    Allergies were reviewed, assessed, and updated with the patient.      Patient does not use any multi-dose medications prior to admission.    The information provided in this note is only as accurate as the sources available at the time of the update(s).    Thank you for the opportunity to participate in the care of this patient.    Emily Ponce, PharmD  12/24/2022 9:10 PM

## 2022-12-26 NOTE — PLAN OF CARE
Goal Outcome Evaluation:      Problem: Plan of Care - These are the overarching goals to be used throughout the patient stay.    Goal: Plan of Care Review  Description: The Plan of Care Review/Shift note should be completed every shift.  The Outcome Evaluation is a brief statement about your assessment that the patient is improving, declining, or no change.  This information will be displayed automatically on your shift note.  Outcome: Progressing     Problem: Gas Exchange Impaired  Goal: Optimal Gas Exchange  Outcome: Progressing    Problem: Pain Chronic (Persistent)  Goal: Optimal Pain Control and Function  Outcome: Progressing  Patient reports pain 10/10 controlled with Percocet prn and scheduled MS Contin.  Patient up with SBA to bathroom.  VSS.  Patient continues to have a frequent congested cough.

## 2022-12-26 NOTE — SIGNIFICANT EVENT
Significant Event Note    Time of event: 7:46 PM December 25, 2022    discharge Vanc, discharge telemetry  Negative for MRSA    Nahed Harris MD

## 2022-12-26 NOTE — PLAN OF CARE
Problem: Gas Exchange Impaired  Goal: Optimal Gas Exchange  Outcome: Progressing  Intervention: Optimize Oxygenation and Ventilation    Problem: Pneumonia  Goal: Effective Oxygenation and Ventilation  Outcome: Progressing  Intervention: Optimize Oxygenation and Ventilation    Problem: Pain Chronic (Persistent)  Goal: Optimal Pain Control and Function  Outcome: Progressing  I   Goal Outcome Evaluation:         A&Ox4. Lidocaine patch in place on neck and prn percocet given for back pain. Up to bedside commode independently otherwise ambulates SBA. Denies SOB at rest. SpO2 92% on RA. Port SL. Heparin gtt discontinued today.

## 2022-12-27 NOTE — PROGRESS NOTES
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    Principal Problem:    Multiple subsegmental pulmonary emboli without acute cor pulmonale (H) (12/24/2022)  Active Problems:    LLL CAP (12/25/2022)    Stage IV NSC adenoca of lung, with BBL mets, Primary tumor LLL (H) (12/25/2022)    Pulmonary emphysema (H) (3/15/2022)    SOB (shortness of breath) (12/24/2022)    Left leg swelling (12/24/2022), with negative work-up    #History of metastatic non-small lung adenocarcinoma.  #Pulmonary embolism  #Pneumonia  -Diagnosed with non-small left lung adenocarcinoma in 03/2022  -Multiple mets including liver, brain and bone.  -Underwent palliative radiation for bone pain.  -Currently following up with oncology as an outpatient and receiving chemotherapy via trial.  -Presented with few days of cough, left lower extremity swelling and mild shortness of breath.  - CTA chest showed multiple pulmonary emboli in the left upper lobe and right lower lobe.  Left lower lobe infiltrate with small left pleural effusion. Increased size of left lower lobe mass with bilateral pulmonary nodules.  Moderate emphysema.  -Left lower extremity ultrasound did not show any DVT.  -Started on heparin in the ED.  -Started on Zosyn. Later, once inpatient, vancomycin was added. On 12/26/2022 due to negative MRSA screen and lack of fever and leukocytosis Abx was switched to Levaquin PO.      Plan:  Stopped heparin drip. Switched to loading dose of Eliquis.   Transthoracic echocardiogram did not show any abnormality  Switched vancomycin and Zosyn to Levaquin  PO.  Sputum culture pending  Continue to monitor respiratory status.  Continue home pain medications including MS constant 15 mg every 8 hours and oxycodone 5 to 10 mg every 4 hours as needed.  Lidoderm was added to apply to the lower cervical upper thoracic spine region.  Continue bowel regimen.  Continue home Ativan as needed.  Continue home gabapentin 300 mg 3 times daily    DVTP: Eliquis  Code Status: Full  Disposition: Inpatient   Expected LOS: 2 days   Goals for the hospitalization: Anticoagulation and switching to oral anticoagulant, antibiotic treatment of pneumonia, metastatic bone disease pain control. Most likely can be discharged to home tomorrow 12/27/2022.       Diet: Combination Diet Regular Diet Adult    Jewell Catheter: Not present  Central Lines: PRESENT       Cardiac Monitoring: None  Code Status: Full Code      Disposition Plan     Expected Discharge Date: 12/27/2022      Destination: home with family;home with help/services  Discharge Comments: Hep gtt, IV abx switched to oral meds        The patient's care was discussed with the Bedside Nurse and Patient.    Chantelle Dumont MD  Hospitalist Service  [facility]  Securely message with the Vocera Web Console (learn more here)  Text page via [employer] Paging/Directory     Clinically Significant Risk Factors              # Hypoalbuminemia: Lowest albumin = 2.5 g/dL at 12/25/2022  5:53 AM, will monitor as appropriate                ____________________________________________________________________    Interval History   Patient complains of pain over the bony metastatic region such as sacral area and lower C-spine.  Denies SOB or chest pain.  Is on RA.  Is afebrile.  Vital signs are mostly stable.  She was not found to be in any distress and seems to be comfortable.  I had a detailed discussion with her about her illnesses and future plans etc yesterday. She understands that general prognosis of her metastatic cancer is grave  and agrees with current treatments and likes to be full code.  We do not have any positive culture yet.  Echocardiogram is normal.  She is a stable and improved.  We will continue with a current treatments with Eliquis and oral Levaquin. Most likely patient can be discharged home tomorrow.     Data reviewed today: I reviewed all medications, new labs and imaging results over the last 24 hours. I personally reviewed no images or EKG's today.    Physical Exam   Vital Signs: Temp: 98.1  F (36.7  C) Temp src: Oral BP: 103/60 Pulse: 68   Resp: 20 SpO2: 90 % O2 Device: None (Room air) Oxygen Delivery: 1 LPM  Weight: 135 lbs 0 oz   Patient is comfortable and A&Ox3 and in no distress.   HEENT is unremarkable. On RA  Neck is supple with some tenderness over the lower spinal region of neck.  Chest: Normal breathing effort on RA.  There is decreased breathing sounds on left lower lung field with fine crackles without wheezing or rhonchi.  Heart with regular rate and rhythm  Abdomen is benign without tenderness, mass or organomegaly.  Extremities: Positive for pitting edema of left leg.  No cyanosis or clubbing noted.  Neurological exam is generally nonfocal she is alert and oriented    Data   Recent Labs   Lab 12/26/22  0531 12/25/22  0553 12/24/22  1755   WBC  --  5.3 7.6   HGB  --  9.3* 10.0*   MCV  --  94 95   PLT  --  151 160   INR  --   --  1.03   NA  --  140 137   POTASSIUM 3.6 3.6 3.8   CHLORIDE  --  110* 105   CO2  --  22 21*   BUN  --  17 21   CR 0.85 0.84 0.82   ANIONGAP  --  8 11   CARMEN  --  8.5 8.8   GLC  --  111 139*   ALBUMIN  --  2.5*  --    PROTTOTAL  --  5.6*  --    BILITOTAL  --  <0.1  --    ALKPHOS  --  60  --    ALT  --  65*  --    AST  --  65*  --      No results found for this or any previous visit (from the past 24 hour(s)).  Medications     - MEDICATION INSTRUCTIONS -         apixaban ANTICOAGULANT  10 mg Oral BID    Followed by     [START ON 1/2/2023] apixaban ANTICOAGULANT  5 mg Oral BID     folic acid   1,000 mcg Oral Daily     gabapentin  300 mg Oral TID     levofloxacin  750 mg Oral Daily     lidocaine  1 patch Transdermal Q24h    And     lidocaine   Transdermal Q8H ALANIS     morphine  15 mg Oral Q8H     polyethylene glycol  17 g Oral Daily     QUEtiapine  50 mg Oral At Bedtime     sennosides  1 tablet Oral BID     sodium chloride (PF)  3 mL Intracatheter Q8H     umeclidinium  1 puff Inhalation Daily

## 2022-12-27 NOTE — PLAN OF CARE
Pt A&O. PO Eliquis and Levaquin started today. Pt independent to commode. Lidocaine patch in place. Denies any SOB. Swelling in leg improving per pt. VSS with pt on RA.  Problem: Pneumonia  Goal: Resolution of Infection Signs and Symptoms  Outcome: Progressing  Goal: Effective Oxygenation and Ventilation  Outcome: Progressing

## 2022-12-27 NOTE — PROGRESS NOTES
I certify that this patient, Lindsay Diop has been under my care (or a nurse practitioner or physican's assistant working with me). This is the face-to-face encounter for oxygen medical necessity.      Lindsay Diop is now in a chronic stable state and continues to require supplemental oxygen. Patient has continued oxygen desaturation due to Emphysema J43.9  Pulmonary Neoplasm C34.90  acute pulmonary embolism, pneumonia.    Alternative treatment(s) tried or considered and deemed clinically infective for treatment of Pulmonary Neoplasm C34.90  Pneumonia, acute pulmonary embolism, emphysema include inhalers, pulmonary toileting and Chemotherapy, blood thinners, antibiotic,.  If portability is ordered, is the patient mobile within the home? yes

## 2022-12-27 NOTE — PLAN OF CARE
Goal Outcome Evaluation:      Plan of Care Reviewed With: patient, child  Patient has been up in the lounge chair for most of the day. She is alert and oriented, but has a flat affect. Patient c/o Constipation and was able to have a small bowel movement. Appetite is fair on a Regular diet. Home Oxygen Evaluation was completed and patient requires oxygen with all activities. DaughterNeida is here and spoke with Hospitalist and Oncologist Nurse (from [facility])Plans are for discharge home tonight, after Oxygen is delivered.

## 2022-12-27 NOTE — PLAN OF CARE
Pt discharged home w/ daughter Neida via car  at 1640. PIV removed, port deaccessed. Belongings accounted for & sent w/ pt. AVS & questions addressed, pt & daughter voiced no concerns.

## 2022-12-27 NOTE — DISCHARGE INSTRUCTIONS
If you want to consider Home Care services after returning home, please contact your primary care provider at [facility] and ask them to help coordinate these services for you.  Thank you.

## 2022-12-27 NOTE — PROGRESS NOTES
Patient has been assessed for Home Oxygen needs. Oxygen readings:    *Pulse oximetry (SpO2) = 83% on room air at rest while awake.    *SpO2 improved to 93% on 3 liters/minute at rest.    *SpO2 = 86% on room air during activity/with exercise.    *SpO2 improved to 87% on 3 liters/minute during activity/with exercise.

## 2022-12-27 NOTE — PROGRESS NOTES
"Care Management Discharge Note    Discharge Date: 12/27/2022     Discharge Disposition: Home     Discharge Services:OP f/u with Oncology    Discharge DME: None    Discharge Transportation: daughter, Neida    Private pay costs discussed: Not applicable    PAS Confirmation Code:  Not applicable  Patient/family educated on Medicare website which has current facility and service quality ratings:      Education Provided on the Discharge Plan:  yes  Persons Notified of Discharge Plans: pt  Patient/Family in Agreement with the Plan:  yes    Handoff Referral Completed: Yes    Additional Information:  Chart reviewed.  Discharge orders have been written. Pt has had Home Oxygen Evaluation and pt's bedside RN is coordinating delivery of Oxygen for home use.  Met with pt to discuss discharge planning and check interest in home care services for RN visits.  Pt had previously declined services.  Pt states she will be going to stay with her daughter, Neida, for a while and does not feel need for Home Care services at this time.  She states \"maybe later\" after returning to her house.  Provided information on need to contact her PCP if she decides at a later date that she would like Home Care services.    Karolyn Matthews RN        "

## 2022-12-27 NOTE — DISCHARGE SUMMARY
[facility] MEDICINE  DISCHARGE SUMMARY     [facility]  Admission Date: 12/24/2022   Discharge Provider: Ramsey Burleson MD Discharge Date: 12/27/2022   Diet:   Active Diet and Nourishment Order   Procedures     Combination Diet Regular Diet Adult     Diet       Code Status: Full Code   Activity: DCACTIVITY: Activity as tolerated        Condition at Discharge: Stable     REASON FOR PRESENTATION(See Admission Note for Details)     Shortness of breath and productive cough now with left lower extremity edema  PRINCIPAL & ACTIVE DISCHARGE DIAGNOSES     Principal Problem:    Multiple subsegmental pulmonary emboli without acute cor pulmonale (H)  Active Problems:    Pulmonary emphysema (H)    SOB (shortness of breath)    Left leg swelling    LLL CAP    Stage IV NSC adenoca of lung, with BBL mets, Primary tumor LLL (H)  Acute respiratory failure with hypoxia secondary to PE and lung neoplasm    PENDING LABS     Unresulted Labs Ordered in the Past 30 Days of this Admission     No orders found from 11/24/2022 to 12/25/2022.            PROCEDURES ( this hospitalization only)          RECOMMENDATIONS TO OUTPATIENT PROVIDER FOR F/U VISIT     Follow-up Appointments     Follow-up and recommended labs and tests       Follow up with Dr. Dent your oncologist for appointment on 12/29               DISPOSITION     Home    SUMMARY OF HOSPITAL COURSE:      A pleasant 67-year-old with history of non-small cell lung cancer with metastasis to the liver brain and bone presented to the hospital on December 24 complaining of productive cough shortness of breath on exertion and 1 day left lower extremity edema, further evaluation in the ER including CT chest showed left upper lobe and lingula as well as to the right lower lobe PE, left lower lobe infiltrate and left pleural effusion and increasing in the size of left lower lobe mass, she was started on IV  heparin and IV antibiotic and admitted for further treatment    Acute respiratory failure with hypoxia  Secondary to PE and lung mass along with pneumonia  She continues to require oxygen up to 3 L on day of discharge home oxygen has been ordered      Multiple subsegmental PE in the left upper lobe lingula and right lower lobe  Seen on CTA chest, she was on IV heparin transition to p.o. Eliquis    The plan is to continue Eliquis for few more days at a higher dose and switch to 5 mg twice daily on January 2, prescription was sent to the patient's preferred pharmacy, discussed with the daughter and her oncologist about the plan of care    History of metastatic non-small cell lung cancer  Diagnosed earlier this year had metastasis to liver brain and bone,  This admission had CT chest showed evidence of enlarging lung mass along with nodules and with distal lymphadenopathy, pleural effusion,    she is following with oncology and has appointment scheduled on December 29  I called her oncologist and gave him update about her hospital condition they expect to see the patient on her appointment in 2 days, I discussed with the daughter about the plan of care    Pneumonia of the left lung  Was initially started on Zosyn and vancomycin,  MRSA screen was negative, she was switched to Levaquin p.o.  Continue Levaquin for 3 more days upon discharge, her procalcitonin is low and white count is improving    Patient continued to be clinically improving and was discharged home in a stable condition, discussed with her daughter the plan of care I also called coordinated the plan her oncologist    Discharge Medications with Med changes:     Current Discharge Medication List      START taking these medications    Details   !! apixaban ANTICOAGULANT (ELIQUIS) 5 MG tablet Take 2 tablets (10 mg) by mouth 2 times daily for 6 days  Qty: 24 tablet, Refills: 0    Associated Diagnoses: Multiple subsegmental pulmonary emboli without acute cor  pulmonale (H)      !! apixaban ANTICOAGULANT (ELIQUIS) 5 MG tablet Take 1 tablet (5 mg) by mouth 2 times daily for 90 days  Qty: 180 tablet, Refills: 0    Associated Diagnoses: Multiple subsegmental pulmonary emboli without acute cor pulmonale (H)      levofloxacin (LEVAQUIN) 500 MG tablet Take 1 tablet (500 mg) by mouth daily for 3 days  Qty: 3 tablet, Refills: 0    Associated Diagnoses: Pneumonia of left lower lobe due to infectious organism; Pneumonia of left lower lobe due to infectious organism       !! - Potential duplicate medications found. Please discuss with provider.      CONTINUE these medications which have CHANGED    Details   aspirin (ASA) 81 MG chewable tablet Take 1 tablet (81 mg) by mouth daily Hold for now while on eliquis high dose, may resume once ok with oncology and she is on low dose eliquis    Associated Diagnoses: Preventative health care      folic acid (FOLVITE) 1 MG tablet Take 1 tablet (1,000 mcg) by mouth daily Hold for now while on levaquin    Associated Diagnoses: Preventative health care      multivitamin w/minerals (THERA-VIT-M) tablet Take 2 tablets by mouth daily Hold while on po levaquin    Associated Diagnoses: Preventative health care         CONTINUE these medications which have NOT CHANGED    Details   dexamethasone (DECADRON) 4 MG tablet Take 8 mg by mouth See Admin Instructions Take 2 Tablets (8 mg) by mouth two times a day with meals. for 3 days the day before, the day of, and the day after treatment      gabapentin (NEURONTIN) 300 MG capsule Take 300 mg by mouth 3 times daily      lidocaine (XYLOCAINE) 5 % external ointment 3 times daily      LORazepam (ATIVAN) 0.5 MG tablet Take 1-2 mg by mouth every 4 hours as needed      morphine (MS CONTIN) 15 MG CR tablet Take 15 mg by mouth every 8 hours      ondansetron (ZOFRAN) 8 MG tablet Take 8 mg by mouth every 8 hours as needed      oxyCODONE (ROXICODONE) 5 MG tablet Take 5-10 mg by mouth every 4 hours as needed       oxyCODONE-acetaminophen (PERCOCET) 5-325 MG tablet Take 1-2 tablets by mouth every 4 hours as needed      polyethylene glycol (MIRALAX) 17 GM/Dose powder Take 17 g by mouth daily      prochlorperazine (COMPAZINE) 10 MG tablet Take 10 mg by mouth every 8 hours as needed      QUEtiapine (SEROQUEL) 50 MG tablet Take 1-2 tablets by mouth At Bedtime      senna (SENOKOT) 8.6 MG tablet Take 17.2 mg by mouth 2 times daily Can increase up to 4 tabs twice daily if ongoing constipation.      SPIRIVA RESPIMAT 2.5 MCG/ACT inhaler Inhale 2 puffs into the lungs daily         STOP taking these medications       ibuprofen (ADVIL/MOTRIN) 200 MG tablet Comments:   Reason for Stopping:                     Rationale for medication changes:      Started Levaquin for treatment of pneumonia, hold vitamins, minerals and folic acid while on Levaquin secondary to interactions,  Discontinue ibuprofen due to increased risk of bleeding with Eliquis  Oxygen for hypoxia  Eliquis blood thinner for treatment of PE,  Hold aspirin while on higher dose of Eliquis discussed with her oncologist if okay to resume aspirin while on lower dose of Eliquis        Consults       PHARMACY IP CONSULT  PHARMACY IP CONSULT  CARE MANAGEMENT / SOCIAL WORK IP CONSULT  SMOKING CESSATION PROGRAM IP CONSULT  CARE MANAGEMENT / SOCIAL WORK IP CONSULT  PHARMACY TO DOSE VANCO    Immunizations given this encounter       There is no immunization history on file for this patient.              SIGNIFICANT IMAGING FINDINGS     Results for orders placed or performed during the hospital encounter of 12/24/22   US Lower Extremity Venous Duplex Left    Impression    IMPRESSION:  1.  No deep venous thrombosis in the left lower extremity.   CT Chest Pulmonary Embolism w Contrast    Impression    IMPRESSION:  1.  Pulmonary emboli to the pulmonary arteries to the left upper lobe and lingula as well as to the right lower lobe. No evidence of right heart strain.  2.  Left lower lobe  infiltrate and left pleural effusion. Infiltrate appears to be increased when compared with the previous exam.  3.  Increased size of the left lower lobe mass.  4.  Bilateral pulmonary nodules.  5.  Moderate emphysema.  6.  Increased mediastinal lymphadenopathy.    7.  These findings were discussed with Dr. Basilio at the time of this dictation.   Echocardiogram Complete   Result Value Ref Range    LVEF  60-65%        SIGNIFICANT LABORATORY FINDINGS     Most Recent 3 CBC's:Recent Labs   Lab Test 12/25/22  0553 12/24/22  1755   WBC 5.3 7.6   HGB 9.3* 10.0*   MCV 94 95    160     Most Recent 3 BMP's:Recent Labs   Lab Test 12/27/22  0532 12/26/22  0531 12/25/22  0553 12/24/22  1755   NA  --   --  140 137   POTASSIUM 3.6 3.6 3.6 3.8   CHLORIDE  --   --  110* 105   CO2  --   --  22 21*   BUN  --   --  17 21   CR 0.94 0.85 0.84 0.82   ANIONGAP  --   --  8 11   CARMEN  --   --  8.5 8.8   GLC  --   --  111 139*           Discharge Orders        Reason for your hospital stay    Shortness of breath and PE     Follow-up and recommended labs and tests     Follow up with Dr. Dent your oncologist for appointment on 12/29     Activity    Your activity upon discharge: activity as tolerated     Oxygen Adult/Peds    Oxygen Documentation:   I certify that this patient, Lindsay Diop has been under my care (or a nurse practitioner or physician's assistant working with me). This is the face-to-face encounter for oxygen medical necessity.      Lindsay Diop is now in a chronic stable state and continues to require supplemental oxygen. Patient has continued oxygen desaturation due to Pulmonary Embolism Chronic I27.82  Pulmonary Neoplasm C34.90.    Alternative treatment(s) tried or considered and deemed clinically infective for treatment of Pulmonary embolism   Pulmonary Neoplasm C34.90 include nebulizers, pulmonary toileting, and blood thinners, chemotherapy and radiation .  If portability is ordered, is the patient mobile within the  home? yes    **Patients who qualify for home O2 coverage under the CMS guidelines require ABG tests or O2 sat readings obtained closest to, but no earlier than 2 days prior to the discharge, as evidence of the need for home oxygen therapy. Testing must be performed while patient is in the chronic stable state. See notes for O2 sats.**     Diet    Follow this diet upon discharge: Orders Placed This Encounter      Combination Diet Regular Diet Adult       Examination   Physical Exam   Temp:  [98.1  F (36.7  C)-99.8  F (37.7  C)] 98.9  F (37.2  C)  Pulse:  [68-90] 90  Resp:  [17-20] 17  BP: (103-129)/(57-71) 129/60  SpO2:  [87 %-94 %] 93 %  Wt Readings from Last 1 Encounters:   12/27/22 63.1 kg (139 lb 1.8 oz)       Constitutional: awake, alert, cooperative, no apparent distress, and appears stated age  Respiratory: No increased work of breathing, good air exchange, clear to auscultation bilaterally, no crackles or wheezing  Cardiovascular: Normal apical impulse, regular rate and rhythm, normal S1 and S2, no S3 or S4, and no murmur noted  GI: No scars, normal bowel sounds, soft, non-distended, non-tender, no masses palpated, no hepatosplenomegally      Please see EMR for more detailed significant labs, imaging, consultant notes etc.    IRamsey MD, personally saw the patient today and spent greater than 30 minutes discharging this patient.    Ramsey Burleson MD  [facility]    CC:[facility]

## 2022-12-27 NOTE — CONSULTS
Integrative Therapy Consult    Healing PresenceYes  Essential Oils: Topical (EO/Topical Oil), Inhalation (EO/Topical oil)     Support Healing process blend - HC, Lavender Massage Oil - HC       Healing Music: TV/Care channel     Breathwork:       Guided Imagery:       Acupressure: Hands on Li4     Oshibori:       Energy Therapy:       Healing Touch:       Reiki:       Qi Gong:     Massage: Hand, Foot      Targeted Massage:    Sleep Promotion:       Other Therapy:       Intervention Reason: Comfort, Well Being     Pre and Post Session Scores: Patient Desires Treatment: yes                             Delivery:         Referrals:      Anna Boyd

## 2022-12-28 NOTE — PROGRESS NOTES
Clinic Care Coordination Contact  [facility]: Post-Discharge Note  SITUATION                                                      Admission:    Admission Date: 12/24/22   Reason for Admission: 1. Multiple subsegmental pulmonary emboli without acute cor pulmonale (H)   2. SOB (shortness of breath)   3. Left leg swelling   4. Pneumonia of left lower lobe due to infectious organism  Discharge:   Discharge Date: 12/27/22  Discharge Diagnosis: Principal Problem:    Multiple subsegmental pulmonary emboli without acute cor pulmonale (H)  Active Problems:    Pulmonary emphysema (H)    SOB (shortness of breath)    Left leg swelling    LLL CAP    Stage IV NSC adenoca of lung, with BBL mets, Primary tumor LLL (H)  Acute respiratory failure with hypoxia secondary to PE and lung neoplasm    BACKGROUND                                                      Per hospital discharge summary and inpatient provider notes:    Lindsay Diop is a 67 year old female with pertinent medical history of hypercholesterolemia, pulmonary emphysema, malignant neoplasm of lower lobe of left lung, brain metastases, bone metastases, liver metastases who presents to the ED by walk in for evaluation of leg swelling.       ASSESSMENT           Discharge Assessment  How are you doing now that you are home?: Patient states she is doing fine. States better because she is home (at her daughters house).  How are your symptoms? (Red Flag symptoms escalate to triage hotline per guidelines): Improved  Do you feel your condition is stable enough to be safe at home until your provider visit?: Yes  Does the patient have their discharge instructions? : Yes  Does the patient have questions regarding their discharge instructions? : No  Were you started on any new medications or were there changes to any of your previous medications? : Yes  Does the patient have all of their medications?: Yes  Do you have questions regarding any of your medications? : No  Do you  have all of your needed medical supplies or equipment (DME)?  (i.e. oxygen tank, CPAP, cane, etc.): Yes  Discharge follow-up appointment scheduled within 14 calendar days? : Yes  Discharge Follow Up Appointment Date: 12/29/22  Discharge Follow Up Appointment Scheduled with?: Specialty Care Provider (Oncology)         Post-op (Clinicians Only)  Did the patient have surgery or a procedure: No      PLAN                                                      Outpatient Plan:    Follow up with Dr. Dent your oncologist for appointment on 12/29       No future appointments.      For any urgent concerns, please contact our 24 hour nurse triage line: [phone] ([phone])         Lily Landers RN

## 2023-01-01 LAB
ATRIAL RATE - MUSE: 70 BPM
DIASTOLIC BLOOD PRESSURE - MUSE: NORMAL MMHG
INTERPRETATION ECG - MUSE: NORMAL
P AXIS - MUSE: 54 DEGREES
PR INTERVAL - MUSE: 152 MS
QRS DURATION - MUSE: 86 MS
QT - MUSE: 408 MS
QTC - MUSE: 440 MS
R AXIS - MUSE: -37 DEGREES
SYSTOLIC BLOOD PRESSURE - MUSE: NORMAL MMHG
T AXIS - MUSE: 40 DEGREES
VENTRICULAR RATE- MUSE: 70 BPM

## 2023-04-03 PROBLEM — C78.7 MALIGNANT NEOPLASM METASTATIC TO LIVER (H): Status: ACTIVE | Noted: 2022-04-13
